# Patient Record
Sex: FEMALE | Race: WHITE | Employment: FULL TIME | URBAN - METROPOLITAN AREA
[De-identification: names, ages, dates, MRNs, and addresses within clinical notes are randomized per-mention and may not be internally consistent; named-entity substitution may affect disease eponyms.]

---

## 2018-07-30 ENCOUNTER — TELEPHONE (OUTPATIENT)
Dept: FAMILY MEDICINE CLINIC | Age: 23
End: 2018-07-30

## 2018-07-30 ENCOUNTER — OFFICE VISIT (OUTPATIENT)
Dept: FAMILY MEDICINE CLINIC | Age: 23
End: 2018-07-30

## 2018-07-30 VITALS
DIASTOLIC BLOOD PRESSURE: 72 MMHG | RESPIRATION RATE: 16 BRPM | WEIGHT: 104 LBS | HEIGHT: 60 IN | SYSTOLIC BLOOD PRESSURE: 109 MMHG | HEART RATE: 88 BPM | TEMPERATURE: 98.8 F | BODY MASS INDEX: 20.42 KG/M2

## 2018-07-30 DIAGNOSIS — N34.2 INFECTIVE URETHRITIS: ICD-10-CM

## 2018-07-30 DIAGNOSIS — F43.10 PTSD (POST-TRAUMATIC STRESS DISORDER): ICD-10-CM

## 2018-07-30 DIAGNOSIS — Z00.00 PHYSICAL EXAM: ICD-10-CM

## 2018-07-30 DIAGNOSIS — F12.10 MARIJUANA ABUSE: ICD-10-CM

## 2018-07-30 DIAGNOSIS — Z76.89 ENCOUNTER TO ESTABLISH CARE: ICD-10-CM

## 2018-07-30 DIAGNOSIS — F32.0 MILD SINGLE CURRENT EPISODE OF MAJOR DEPRESSIVE DISORDER (HCC): ICD-10-CM

## 2018-07-30 DIAGNOSIS — Z76.89 ENCOUNTER TO ESTABLISH CARE: Primary | ICD-10-CM

## 2018-07-30 DIAGNOSIS — F31.9 BIPOLAR 1 DISORDER (HCC): ICD-10-CM

## 2018-07-30 DIAGNOSIS — N92.6 MENSTRUAL CHANGES: ICD-10-CM

## 2018-07-30 LAB
BASOPHILS ABSOLUTE: 0.1 K/UL (ref 0–0.2)
BASOPHILS RELATIVE PERCENT: 0.5 %
CONTROL: NORMAL
EOSINOPHILS ABSOLUTE: 0 K/UL (ref 0–0.6)
EOSINOPHILS RELATIVE PERCENT: 0.2 %
HCT VFR BLD CALC: 35.8 % (ref 36–48)
HEMOGLOBIN: 12 G/DL (ref 12–16)
LYMPHOCYTES ABSOLUTE: 1.5 K/UL (ref 1–5.1)
LYMPHOCYTES RELATIVE PERCENT: 14.2 %
MCH RBC QN AUTO: 30.8 PG (ref 26–34)
MCHC RBC AUTO-ENTMCNC: 33.6 G/DL (ref 31–36)
MCV RBC AUTO: 91.9 FL (ref 80–100)
MONOCYTES ABSOLUTE: 0.4 K/UL (ref 0–1.3)
MONOCYTES RELATIVE PERCENT: 3.7 %
NEUTROPHILS ABSOLUTE: 8.7 K/UL (ref 1.7–7.7)
NEUTROPHILS RELATIVE PERCENT: 81.4 %
PDW BLD-RTO: 15.3 % (ref 12.4–15.4)
PLATELET # BLD: 277 K/UL (ref 135–450)
PMV BLD AUTO: 7.9 FL (ref 5–10.5)
PREGNANCY TEST URINE, POC: POSITIVE
RBC # BLD: 3.89 M/UL (ref 4–5.2)
WBC # BLD: 10.7 K/UL (ref 4–11)

## 2018-07-30 PROCEDURE — 81025 URINE PREGNANCY TEST: CPT | Performed by: NURSE PRACTITIONER

## 2018-07-30 PROCEDURE — 99385 PREV VISIT NEW AGE 18-39: CPT | Performed by: NURSE PRACTITIONER

## 2018-07-30 ASSESSMENT — ENCOUNTER SYMPTOMS
WHEEZING: 0
BLOOD IN STOOL: 0
SPUTUM PRODUCTION: 0
RESPIRATORY NEGATIVE: 1
EYE REDNESS: 0
SHORTNESS OF BREATH: 0
EYES NEGATIVE: 1
SINUS PAIN: 0
NAUSEA: 1
STRIDOR: 0
VOMITING: 0
SORE THROAT: 0
DOUBLE VISION: 0
ABDOMINAL PAIN: 0
EYE DISCHARGE: 0
CONSTIPATION: 0
ORTHOPNEA: 0
PHOTOPHOBIA: 0
BLURRED VISION: 0
BACK PAIN: 0
DIARRHEA: 1
EYE PAIN: 0
HEMOPTYSIS: 0
COUGH: 0
HEARTBURN: 0

## 2018-07-30 ASSESSMENT — PATIENT HEALTH QUESTIONNAIRE - PHQ9
SUM OF ALL RESPONSES TO PHQ9 QUESTIONS 1 & 2: 2
SUM OF ALL RESPONSES TO PHQ QUESTIONS 1-9: 2
1. LITTLE INTEREST OR PLEASURE IN DOING THINGS: 0
2. FEELING DOWN, DEPRESSED OR HOPELESS: 2

## 2018-07-30 NOTE — PATIENT INSTRUCTIONS
Thank you for enrolling in [address] Please follow the instructions below to securely access your online medical record. [employer] allows you to send messages to your doctor, view your test results, renew your prescriptions, schedule appointments, and more. How Do I Sign Up? 1. In your Internet browser, go to [email or web address]  2. Click on the Sign Up Now link in the Sign In box. You will see the New Member Sign Up page. 3. Enter your [employer] Access Code exactly as it appears below. You will not need to use this code after youve completed the sign-up process. If you do not sign up before the expiration date, you must request a new code. [employer] Access Code: Activation code not generated  Current [employer] Status: Active    4. Enter your Social Security Number ([id number]) and Date of Birth (mm/dd/yyyy) as indicated and click Submit. You will be taken to the next sign-up page. 5. Create a [employer] ID. This will be your [employer] login ID and cannot be changed, so think of one that is secure and easy to remember. 6. Create a [employer] password. You can change your password at any time. 7. Enter your Password Reset Question and Answer. This can be used at a later time if you forget your password. 8. Enter your e-mail address. You will receive e-mail notification when new information is available in [address] 9. Click Sign Up. You can now view your medical record. Additional Information  If you have questions, please contact your physician practice where you receive care. Remember, [employer] is NOT to be used for urgent needs. For medical emergencies, dial 911.

## 2018-07-30 NOTE — PROGRESS NOTES
Subjective:      Chief Complaint   Patient presents with    Established New Doctor    GI Problem     poss irritable bowel - few years       Patient ID: Pina Perry is a 25 y.o. female who presents to be established as a new pt. She has not had an PCP only her psychologist. Pt states she has chronic depression, bipolar and PTSD She has hx of \"cutting\". Feels her health has decreased due to her depression. She went to Urgent care and dx w chlamydia and took the medication prescribed. Frequent urination. IBS diagnosed for the past 4 years  There may be a chance she is pregnant. Breasts tender and have increased in size. Menses only two days long. She and her fiance have been trying for the past 4 months and is taking her prenatal vitamins. 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 Mental Health Problem   The primary symptoms include dysphoric mood, negative symptoms and somatic symptoms. The primary symptoms do not include hallucinations. The current episode started more than 1 month ago. This is a chronic problem. The somatic symptoms began more than 1 month ago. The somatic symptoms have been unchanged since their onset. The symptoms are moderate. Somatic symptoms include fatigue. Somatic symptoms do not include headaches, abdominal pain, heartburn, constipation, back pain or myalgias. The onset of the illness is precipitated by emotional stress. The degree of incapacity that she is experiencing as a consequence of her illness is moderate. Additional symptoms of the illness include insomnia, fatigue, attention impairment and poor judgment. Additional symptoms of the illness do not include headaches, abdominal pain or seizures. She does not admit to suicidal ideas. She does not have a plan to commit suicide. She does not contemplate harming herself. She has not already injured self. She does not contemplate injuring another person. She has not already  injured another person.        Family History   Problem Relation Age of Onset    High Blood Pressure Mother     High Cholesterol Mother     Mental Illness Mother     High Blood Pressure Father     High Cholesterol Father     Mental Illness Father     OCD Sister     High Blood Pressure Maternal Grandmother     Scoliosis Maternal Grandmother     Heart Disease Maternal Grandfather     Clotting Disorder Maternal Grandfather     Mental Illness Maternal Grandfather     Cancer Paternal Grandfather        Social History     Social History    Marital status: Single     Spouse name: N/A    Number of children: N/A    Years of education: N/A     Occupational History    Not on file. Social History Main Topics    Smoking status: Former Smoker     Packs/day: 0.25     Years: 10.00     Quit date: 6/30/2018    Smokeless tobacco: Never Used    Alcohol use No      Comment: holidays    Drug use: Yes     Types: Marijuana    Sexual activity: Yes     Partners: Male     Other Topics Concern    Not on file     Social History Narrative    No narrative on file       No current outpatient prescriptions on file prior to visit. No current facility-administered medications on file prior to visit. Review of Systems   Constitutional: Positive for fatigue and malaise/fatigue. Negative for chills, diaphoresis, fever and weight loss. HENT: Negative. Negative for congestion, ear discharge, ear pain, hearing loss, nosebleeds, sinus pain, sore throat and tinnitus. Eyes: Negative. Negative for blurred vision, double vision, photophobia, pain, discharge and redness. Respiratory: Negative. Negative for cough, hemoptysis, sputum production, shortness of breath, wheezing and stridor. Cardiovascular: Positive for chest pain and palpitations. Negative for orthopnea, claudication, leg swelling and PND. Severe anxiety attacks w chest pressure   Gastrointestinal: Positive for diarrhea and nausea.  Negative for abdominal pain, blood in stool, constipation, heartburn, melena and vomiting. Genitourinary: Positive for frequency and urgency. Negative for dysuria, flank pain and hematuria. Musculoskeletal: Negative. Negative for back pain, falls, joint pain, myalgias and neck pain. Skin: Negative. Negative for itching and rash. Neurological: Negative. Negative for dizziness, tingling, tremors, sensory change, speech change, focal weakness, seizures, loss of consciousness, weakness and headaches. Endo/Heme/Allergies: Negative. Negative for environmental allergies and polydipsia. Does not bruise/bleed easily. Psychiatric/Behavioral: Positive for depression, dysphoric mood and substance abuse (marijuana). Negative for hallucinations, memory loss and suicidal ideas. The patient is nervous/anxious and has insomnia. Patient Active Problem List    Diagnosis Date Noted   Sonal Pinon 2015     Priority: High    Encounter for supervision of other normal pregnancy 2015    UTI in pregnancy 2015    Adjustment disorder with mixed anxiety and depressed mood 02/15/2015    Drug abuse, marijuana 2015    Other specified  screening(V28.89) 2015    Smoker 2015    History of elective  2015       Objective:     Physical Exam   Constitutional: She is oriented to person, place, and time. She appears well-developed and well-nourished. No distress. HENT:   Head: Normocephalic and atraumatic. Right Ear: External ear normal.   Left Ear: External ear normal.   Nose: Nose normal.   Mouth/Throat: Oropharynx is clear and moist. No oropharyngeal exudate. Eyes: Conjunctivae and EOM are normal. Pupils are equal, round, and reactive to light. Right eye exhibits no discharge. Left eye exhibits no discharge. No scleral icterus. Neck: Normal range of motion. Neck supple. No JVD present. No tracheal deviation present. No thyromegaly present.    Cardiovascular: Normal rate, regular rhythm, normal heart sounds and 30 minutes on four or more days during the week. Counseled on skin safety, SPF 30 or higher prior to going outdoors and reapplication every two hours while outside.  Monitor moles for changes, report to provider if greater than 6 mm, color variations, asymmetry, redness, scales, and/or overlying skin changes  Counseled on safety, wear seatbelt, do not consume alcohol and drive or drive with anyone who has consumed alcohol  Counseled on safe sex practices, wear condoms, limit number of sexual partners  Counseled on importance of monthly self breast exams, perform on same time each month, monitor for new lumps/bumps/masses, tenderness, changes to size or contour, dimpling, nipple discharge, new nipple retraction, and overlying skin changes, report to provider  I will call patient with results of her lab work in the morning and discuss direction of care

## 2018-07-31 ENCOUNTER — TELEPHONE (OUTPATIENT)
Dept: FAMILY MEDICINE CLINIC | Age: 23
End: 2018-07-31

## 2018-07-31 DIAGNOSIS — Z3A.01 LESS THAN 8 WEEKS GESTATION OF PREGNANCY: Primary | ICD-10-CM

## 2018-07-31 DIAGNOSIS — F32.0 MILD SINGLE CURRENT EPISODE OF MAJOR DEPRESSIVE DISORDER (HCC): Primary | ICD-10-CM

## 2018-07-31 LAB
A/G RATIO: 1.8 (ref 1.1–2.2)
ALBUMIN SERPL-MCNC: 4.9 G/DL (ref 3.4–5)
ALP BLD-CCNC: 55 U/L (ref 40–129)
ALT SERPL-CCNC: 9 U/L (ref 10–40)
ANION GAP SERPL CALCULATED.3IONS-SCNC: 15 MMOL/L (ref 3–16)
AST SERPL-CCNC: 15 U/L (ref 15–37)
BILIRUB SERPL-MCNC: 0.5 MG/DL (ref 0–1)
BUN BLDV-MCNC: 10 MG/DL (ref 7–20)
CALCIUM SERPL-MCNC: 9.9 MG/DL (ref 8.3–10.6)
CHLORIDE BLD-SCNC: 102 MMOL/L (ref 99–110)
CO2: 24 MMOL/L (ref 21–32)
CREAT SERPL-MCNC: 0.6 MG/DL (ref 0.6–1.1)
GFR AFRICAN AMERICAN: >60
GFR NON-AFRICAN AMERICAN: >60
GLOBULIN: 2.7 G/DL
GLUCOSE BLD-MCNC: 87 MG/DL (ref 70–99)
GONADOTROPIN, CHORIONIC (HCG) QUANT: 97.8 MIU/ML
POTASSIUM SERPL-SCNC: 4.1 MMOL/L (ref 3.5–5.1)
SODIUM BLD-SCNC: 141 MMOL/L (ref 136–145)
TOTAL PROTEIN: 7.6 G/DL (ref 6.4–8.2)
TSH SERPL DL<=0.05 MIU/L-ACNC: 1.96 UIU/ML (ref 0.27–4.2)
VITAMIN D 25-HYDROXY: 35 NG/ML

## 2018-07-31 NOTE — TELEPHONE ENCOUNTER
Spoke to patient gave her the results of her lab work yesterday including a pregnancy test which was positive for both blood and urine. Patient was okay with this news. Does not have a gynecologist so referral for Dr. Jeanne Ding was placed. Phone number given to patient to call and make her own appointment. Initially gave her an appointment for CBT with Mekhi Blandon in [place] however patient's insurance does not cover her so an order was placed for Dr. Caitlin Gibson in [facility] who will be able to treat her based on her insurance. Mother stated that patient had a really bad panic attack last night, however I am not able to prescribe anything for her.  It is important that she sees the gynecologist as soon as possible

## 2018-08-02 ENCOUNTER — TELEPHONE (OUTPATIENT)
Dept: FAMILY MEDICINE CLINIC | Age: 23
End: 2018-08-02

## 2018-08-06 ENCOUNTER — TELEPHONE (OUTPATIENT)
Dept: FAMILY MEDICINE CLINIC | Age: 23
End: 2018-08-06

## 2018-08-07 ENCOUNTER — TELEPHONE (OUTPATIENT)
Dept: FAMILY MEDICINE CLINIC | Age: 23
End: 2018-08-07

## 2018-10-20 ENCOUNTER — HOSPITAL ENCOUNTER (EMERGENCY)
Age: 23
Discharge: HOME | End: 2018-10-20
Attending: EMERGENCY MEDICINE
Payer: COMMERCIAL

## 2018-10-20 VITALS
SYSTOLIC BLOOD PRESSURE: 110 MMHG | HEART RATE: 92 BPM | HEIGHT: 62 IN | RESPIRATION RATE: 18 BRPM | DIASTOLIC BLOOD PRESSURE: 68 MMHG | BODY MASS INDEX: 18.4 KG/M2 | WEIGHT: 100 LBS | OXYGEN SATURATION: 100 % | TEMPERATURE: 97.1 F

## 2018-10-20 DIAGNOSIS — Z34.90 PREGNANCY, UNSPECIFIED GESTATIONAL AGE: ICD-10-CM
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
## 2018-10-20 DIAGNOSIS — S39.012A STRAIN OF LUMBAR REGION, INITIAL ENCOUNTER: Primary | ICD-10-CM

## 2018-10-20 LAB
ABO/RH: NORMAL
BACTERIA: ABNORMAL /HPF
BILIRUBIN URINE: NEGATIVE
BLOOD, URINE: ABNORMAL
CLARITY: CLEAR
COLOR: YELLOW
CRYSTALS, UA: ABNORMAL /HPF
EPITHELIAL CELLS, UA: ABNORMAL /HPF
GLUCOSE URINE: NEGATIVE MG/DL
GONADOTROPIN, CHORIONIC (HCG) QUANT: NORMAL MIU/ML
KETONES, URINE: NEGATIVE MG/DL
LEUKOCYTE ESTERASE, URINE: ABNORMAL
MICROSCOPIC EXAMINATION: YES
MUCUS: ABNORMAL /LPF
NITRITE, URINE: NEGATIVE
PH UA: 6
PROTEIN UA: NEGATIVE MG/DL
RBC UA: ABNORMAL /HPF (ref 0–2)
SPECIFIC GRAVITY UA: 1.02
URINE TYPE: ABNORMAL
UROBILINOGEN, URINE: 0.2 E.U./DL
WBC UA: ABNORMAL /HPF (ref 0–5)

## 2018-10-20 PROCEDURE — 81001 URINALYSIS AUTO W/SCOPE: CPT

## 2018-10-20 PROCEDURE — 99284 EMERGENCY DEPT VISIT MOD MDM: CPT

## 2018-10-20 PROCEDURE — 86900 BLOOD TYPING SEROLOGIC ABO: CPT

## 2018-10-20 PROCEDURE — 84702 CHORIONIC GONADOTROPIN TEST: CPT

## 2018-10-20 PROCEDURE — 86901 BLOOD TYPING SEROLOGIC RH(D): CPT

## 2018-10-20 RX ORDER — IBUPROFEN 200 MG
200 TABLET ORAL EVERY 6 HOURS PRN
COMMUNITY
End: 2018-10-21

## 2018-10-20 ASSESSMENT — ENCOUNTER SYMPTOMS
SHORTNESS OF BREATH: 0
BACK PAIN: 1
ABDOMINAL PAIN: 1

## 2018-10-20 ASSESSMENT — PAIN SCALES - GENERAL: PAINLEVEL_OUTOF10: 3

## 2018-10-20 ASSESSMENT — PAIN DESCRIPTION - LOCATION: LOCATION: BACK

## 2018-10-20 ASSESSMENT — PAIN DESCRIPTION - FREQUENCY: FREQUENCY: CONTINUOUS

## 2018-10-20 ASSESSMENT — PAIN DESCRIPTION - DESCRIPTORS: DESCRIPTORS: ACHING

## 2018-10-20 ASSESSMENT — PAIN DESCRIPTION - PAIN TYPE: TYPE: ACUTE PAIN

## 2018-10-20 ASSESSMENT — PAIN DESCRIPTION - ORIENTATION: ORIENTATION: LOWER
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
## 2018-10-21 ENCOUNTER — HOSPITAL ENCOUNTER (EMERGENCY)
Age: 23
Discharge: HOME | End: 2018-10-21
Attending: EMERGENCY MEDICINE
Payer: COMMERCIAL

## 2018-10-21 VITALS
BODY MASS INDEX: 19.63 KG/M2 | SYSTOLIC BLOOD PRESSURE: 96 MMHG | RESPIRATION RATE: 14 BRPM | HEIGHT: 60 IN | HEART RATE: 72 BPM | WEIGHT: 100 LBS | TEMPERATURE: 98.2 F | OXYGEN SATURATION: 100 % | DIASTOLIC BLOOD PRESSURE: 59 MMHG

## 2018-10-21 DIAGNOSIS — R52 BODY ACHES: ICD-10-CM

## 2018-10-21 DIAGNOSIS — R11.2 NAUSEA VOMITING AND DIARRHEA: Primary | ICD-10-CM

## 2018-10-21 DIAGNOSIS — R19.7 NAUSEA VOMITING AND DIARRHEA: Primary | ICD-10-CM

## 2018-10-21 LAB
A/G RATIO: 1.2 (ref 1.1–2.2)
ALBUMIN SERPL-MCNC: 3.7 G/DL (ref 3.4–5)
ALP BLD-CCNC: 43 U/L (ref 40–129)
ALT SERPL-CCNC: 6 U/L (ref 10–40)
ANION GAP SERPL CALCULATED.3IONS-SCNC: 12 MMOL/L (ref 3–16)
AST SERPL-CCNC: 13 U/L (ref 15–37)
BASOPHILS ABSOLUTE: 0 K/UL (ref 0–0.2)
BASOPHILS RELATIVE PERCENT: 0.4 %
BILIRUB SERPL-MCNC: 0.3 MG/DL (ref 0–1)
BILIRUBIN URINE: NEGATIVE
BLOOD, URINE: NEGATIVE
BUN BLDV-MCNC: 5 MG/DL (ref 7–20)
CALCIUM SERPL-MCNC: 8.9 MG/DL (ref 8.3–10.6)
CHLORIDE BLD-SCNC: 102 MMOL/L (ref 99–110)
CLARITY: CLEAR
CO2: 23 MMOL/L (ref 21–32)
COLOR: YELLOW
CREAT SERPL-MCNC: <0.5 MG/DL (ref 0.6–1.1)
EOSINOPHILS ABSOLUTE: 0 K/UL (ref 0–0.6)
EOSINOPHILS RELATIVE PERCENT: 0.6 %
GFR AFRICAN AMERICAN: >60
GFR NON-AFRICAN AMERICAN: >60
GLOBULIN: 3.2 G/DL
GLUCOSE BLD-MCNC: 86 MG/DL (ref 70–99)
GLUCOSE URINE: NEGATIVE MG/DL
GONADOTROPIN, CHORIONIC (HCG) QUANT: NORMAL MIU/ML
HCT VFR BLD CALC: 32.9 % (ref 36–48)
HEMOGLOBIN: 11.3 G/DL (ref 12–16)
KETONES, URINE: NEGATIVE MG/DL
LEUKOCYTE ESTERASE, URINE: NEGATIVE
LYMPHOCYTES ABSOLUTE: 1.4 K/UL (ref 1–5.1)
LYMPHOCYTES RELATIVE PERCENT: 17.9 %
MCH RBC QN AUTO: 31.4 PG (ref 26–34)
MCHC RBC AUTO-ENTMCNC: 34.3 G/DL (ref 31–36)
MCV RBC AUTO: 91.3 FL (ref 80–100)
MICROSCOPIC EXAMINATION: NORMAL
MONOCYTES ABSOLUTE: 0.3 K/UL (ref 0–1.3)
MONOCYTES RELATIVE PERCENT: 4.4 %
NEUTROPHILS ABSOLUTE: 6 K/UL (ref 1.7–7.7)
NEUTROPHILS RELATIVE PERCENT: 76.7 %
NITRITE, URINE: NEGATIVE
PDW BLD-RTO: 14 % (ref 12.4–15.4)
PH UA: 7.5
PLATELET # BLD: 233 K/UL (ref 135–450)
PMV BLD AUTO: 8.2 FL (ref 5–10.5)
POTASSIUM SERPL-SCNC: 3.6 MMOL/L (ref 3.5–5.1)
PROTEIN UA: NEGATIVE MG/DL
RAPID INFLUENZA  B AGN: NEGATIVE
RAPID INFLUENZA A AGN: NEGATIVE
RBC # BLD: 3.6 M/UL (ref 4–5.2)
SODIUM BLD-SCNC: 137 MMOL/L (ref 136–145)
SPECIFIC GRAVITY UA: 1.01
TOTAL PROTEIN: 6.9 G/DL (ref 6.4–8.2)
URINE TYPE: NORMAL
UROBILINOGEN, URINE: 0.2 E.U./DL
WBC # BLD: 7.9 K/UL (ref 4–11)

## 2018-10-21 PROCEDURE — 81003 URINALYSIS AUTO W/O SCOPE: CPT

## 2018-10-21 PROCEDURE — 84702 CHORIONIC GONADOTROPIN TEST: CPT

## 2018-10-21 PROCEDURE — 87804 INFLUENZA ASSAY W/OPTIC: CPT

## 2018-10-21 PROCEDURE — 96374 THER/PROPH/DIAG INJ IV PUSH: CPT

## 2018-10-21 PROCEDURE — 99284 EMERGENCY DEPT VISIT MOD MDM: CPT

## 2018-10-21 PROCEDURE — 96361 HYDRATE IV INFUSION ADD-ON: CPT

## 2018-10-21 PROCEDURE — 80053 COMPREHEN METABOLIC PANEL: CPT

## 2018-10-21 PROCEDURE — 85025 COMPLETE CBC W/AUTO DIFF WBC: CPT

## 2018-10-21 PROCEDURE — 6360000002 HC RX W HCPCS: Performed by: EMERGENCY MEDICINE

## 2018-10-21 PROCEDURE — 96375 TX/PRO/DX INJ NEW DRUG ADDON: CPT

## 2018-10-21 PROCEDURE — 2580000003 HC RX 258: Performed by: EMERGENCY MEDICINE

## 2018-10-21 PROCEDURE — 6370000000 HC RX 637 (ALT 250 FOR IP): Performed by: EMERGENCY MEDICINE

## 2018-10-21 RX ORDER — METOCLOPRAMIDE HYDROCHLORIDE 5 MG/ML
10 INJECTION INTRAMUSCULAR; INTRAVENOUS ONCE
Status: COMPLETED | OUTPATIENT
Start: 2018-10-21 | End: 2018-10-21

## 2018-10-21 RX ORDER — 0.9 % SODIUM CHLORIDE 0.9 %
1000 INTRAVENOUS SOLUTION INTRAVENOUS ONCE
Status: COMPLETED | OUTPATIENT
Start: 2018-10-21 | End: 2018-10-21

## 2018-10-21 RX ORDER — ACETAMINOPHEN 500 MG
1000 TABLET ORAL ONCE
Status: COMPLETED | OUTPATIENT
Start: 2018-10-21 | End: 2018-10-21

## 2018-10-21 RX ORDER — METOCLOPRAMIDE 10 MG/1
10 TABLET ORAL 3 TIMES DAILY PRN
Qty: 12 TABLET | Refills: 0 | Status: SHIPPED | OUTPATIENT
Start: 2018-10-21 | End: 2019-09-21

## 2018-10-21 RX ORDER — DIPHENHYDRAMINE HYDROCHLORIDE 50 MG/ML
12.5 INJECTION INTRAMUSCULAR; INTRAVENOUS ONCE
Status: COMPLETED | OUTPATIENT
Start: 2018-10-21 | End: 2018-10-21

## 2018-10-21 RX ADMIN — DIPHENHYDRAMINE HYDROCHLORIDE 12.5 MG: 50 INJECTION, SOLUTION INTRAMUSCULAR; INTRAVENOUS at 10:32

## 2018-10-21 RX ADMIN — SODIUM CHLORIDE 1000 ML: 9 INJECTION, SOLUTION INTRAVENOUS at 10:31

## 2018-10-21 RX ADMIN — METOCLOPRAMIDE 10 MG: 5 INJECTION, SOLUTION INTRAMUSCULAR; INTRAVENOUS at 10:31

## 2018-10-21 RX ADMIN — ACETAMINOPHEN 1000 MG: 500 TABLET ORAL at 10:32

## 2018-10-21 ASSESSMENT — PAIN SCALES - GENERAL: PAINLEVEL_OUTOF10: 0

## 2018-10-21 ASSESSMENT — PAIN DESCRIPTION - DESCRIPTORS: DESCRIPTORS: ACHING

## 2018-10-21 ASSESSMENT — PAIN DESCRIPTION - PAIN TYPE: TYPE: ACUTE PAIN

## 2018-10-21 NOTE — ED PROVIDER NOTES
5.0 - 8.0    Protein, UA Negative Negative mg/dL    Urobilinogen, Urine 0.2 <2.0 E.U./dL    Nitrite, Urine Negative Negative    Leukocyte Esterase, Urine Negative Negative    Microscopic Examination Not Indicated     Urine Type Not Specified        ED COURSE/MDM  Patient seen and evaluated. Blood work here is unremarkable as well as her urine. Better after medications, vital signs are stable without any fever. She does have follow-up with ObGyn this week. Discharge her home with [facility] and she can follow up as scheduled. Sera Cabrera CLINICAL IMPRESSION  1. Nausea vomiting and diarrhea    2. Body aches        Blood pressure (!) 96/59, pulse 72, temperature 98.2 °F (36.8 °C), temperature source Oral, resp. rate 14, height 5' (1.524 m), weight 100 lb (45.4 kg), last menstrual period 07/04/2018, SpO2 100 %, not currently breastfeeding. DISPOSITION  Aundrea Shultz was admitted in stable condition. This chart was generated in part by using Dragon Dictation system and may contain errors related to that system including errors in grammar, punctuation, and spelling, as well as words and phrases that may be inappropriate. When dictating, effort is made to correct spelling/grammar errors. If there are any questions or concerns please feel free to contact the dictating provider for clarification.      Xiang Orr DO  ATTENDING EMERGENCY MEDICINE        Cathy Guerrero DO  10/21/18 3664

## 2019-09-15 ENCOUNTER — HOSPITAL ENCOUNTER (EMERGENCY)
Age: 24
Discharge: HOME | End: 2019-09-15
Attending: EMERGENCY MEDICINE
Payer: COMMERCIAL

## 2019-09-15 VITALS
OXYGEN SATURATION: 98 % | HEART RATE: 87 BPM | SYSTOLIC BLOOD PRESSURE: 127 MMHG | DIASTOLIC BLOOD PRESSURE: 67 MMHG | WEIGHT: 100 LBS | BODY MASS INDEX: 19.63 KG/M2 | HEIGHT: 60 IN | TEMPERATURE: 97.8 F | RESPIRATION RATE: 18 BRPM

## 2019-09-15 DIAGNOSIS — F41.1 ANXIETY STATE: Primary | ICD-10-CM

## 2019-09-15 PROCEDURE — 99284 EMERGENCY DEPT VISIT MOD MDM: CPT

## 2019-09-15 RX ORDER — HYDROXYZINE PAMOATE 25 MG/1
50 CAPSULE ORAL 3 TIMES DAILY PRN
Qty: 30 CAPSULE | Refills: 0 | Status: SHIPPED | OUTPATIENT
Start: 2019-09-15 | End: 2019-09-29

## 2019-09-15 RX ORDER — SERTRALINE HYDROCHLORIDE 100 MG/1
100 TABLET, FILM COATED ORAL DAILY
Qty: 30 TABLET | Refills: 0 | Status: SHIPPED | OUTPATIENT
Start: 2019-09-15 | End: 2020-06-19 | Stop reason: ALTCHOICE

## 2019-09-16 NOTE — ED NOTES
Level of Care Disposition: DISCHARGE FROM [facility] SERVICES      Patient was seen by ED provider and [facility] AN AFFILIATE OF [facility] staff. The case was presented to psychiatric provider on-call KAITY Logan. Based on the ED evaluation and information presented to the provider by Brandon Sin RN the decision was made to discharge patient with the following referrals: IOP appointment      RATIONALE FOR NON-ADMISSION:  The patient does not meet criteria for an involuntary psychiatric admission because patient was never suicidal and was only seeking outpatient resources.       Insurance Pre certification Authorization: N/A         Magaly Luke RN  09/15/19 2021

## 2019-09-16 NOTE — ED PROVIDER NOTES
edema.  GASTROINTESTINAL: Abdomen is soft and nontender throughout with active bowel sounds. MUSCULOSKELETAL:  Extremities are nontender with full range of motion. Neurovascularly intact. DERMATOLOGIC: Skin is warm pink and dry. No rash or lesions appreciated. NEUROLOGIC: Awake and alert, oriented ×4 and appears to be at baseline status. Moves all extremities normally. Normal sensory and motor function  PSYCHIATRIC: Awake and alert and cooperative. RADIOLOGY  X-RAYS:  I have reviewed radiologic plain film image(s). ALL OTHER NON-PLAIN FILM IMAGES SUCH AS CT, ULTRASOUND AND MRI HAVE BEEN READ BY THE RADIOLOGIST. No orders to display              PROCEDURES    ED COURSE/MDM  Patient seen and evaluated. Behavioral health staff saw her and talked with her. I do not see need for laboratory studies. We will restart her on her Zoloft and Vistaril. Given resources for outpatient follow-up. Threatened to return if she feels like things are decompensating or having any suicidal homicidal thoughts. I do feel patient can be safely discharged to home. Recommend follow up with PCP in 2-3 days for re-evaluation. Reasons to RT ED discussed. Patient expresses understanding and is in agreement with plan. Patient was given scripts for the following medications. I counseled patient how to take these medications. New Prescriptions    HYDROXYZINE (VISTARIL) 25 MG CAPSULE    Take 2 capsules by mouth 3 times daily as needed for Anxiety    SERTRALINE (ZOLOFT) 100 MG TABLET    Take 1 tablet by mouth daily           CLINICAL IMPRESSION  1. Anxiety state        Blood pressure 127/67, pulse 87, temperature 97.8 °F (36.6 °C), resp. rate 18, height 5' (1.524 m), weight 100 lb (45.4 kg), SpO2 98 %, not currently breastfeeding. DISPOSITION  Patient was discharged to home in good condition.               Gabriella Perez MD  09/15/19 8239

## 2019-09-17 ENCOUNTER — HOSPITAL ENCOUNTER (OUTPATIENT)
Age: 24
Discharge: HOME | End: 2019-09-17
Payer: COMMERCIAL

## 2019-09-17 ENCOUNTER — HOSPITAL ENCOUNTER (OUTPATIENT)
Dept: PSYCHIATRY | Age: 24
Setting detail: THERAPIES SERIES
Discharge: HOME | End: 2019-09-17

## 2019-09-17 VITALS — SYSTOLIC BLOOD PRESSURE: 104 MMHG | DIASTOLIC BLOOD PRESSURE: 62 MMHG | HEART RATE: 60 BPM

## 2019-09-17 DIAGNOSIS — F43.23 ADJUSTMENT DISORDER WITH MIXED ANXIETY AND DEPRESSED MOOD: ICD-10-CM

## 2019-09-17 PROCEDURE — G0410 GRP PSYCH PARTIAL HOSP 45-50: HCPCS | Performed by: COUNSELOR

## 2019-09-17 PROCEDURE — G0410 GRP PSYCH PARTIAL HOSP 45-50: HCPCS
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
## 2019-09-17 NOTE — BH NOTE
didn't get an . [facility] states that she wants to get on her feet before telling her family that she Is pregnant. She states \" I really, just want to break the cycle\". She states that she has struggled with anxiety most of her life , but that it has increased since her recent relationship. She states \" it is like my mind shuts down and its like I cant think and I hyperventilate. \" she states that this occurs 5 times a day. She states that she has a history of cutting and that \" I hold my breathe, not to the point of passing out, but to where people were concerned\". She state that she wants to learn how to deal with her anxiety. She states that she has difficulty with relationships and that she doesn't trust anybody. She also feels that people are out to get her. She states that she has a history of being hospitalized here at [facility] in 1 and in [address] [place] [address] in 2016. She states that the admission here was due to suicidal thoughts and abel was due to postpartum depression. She states that she has a daughter that lives in [address] [place] [address]. She states that she has been off of her medications, but that her zoloft and vistaril were restarted in the ED. This writer did confirm this through ED note and referral to our program.   She denies current SI/HI,AVH. She will begin PHP today.

## 2019-09-18 ENCOUNTER — HOSPITAL ENCOUNTER (OUTPATIENT)
Dept: PSYCHIATRY | Age: 24
Setting detail: THERAPIES SERIES
Discharge: HOME | End: 2019-09-18

## 2019-09-18 ENCOUNTER — HOSPITAL ENCOUNTER (OUTPATIENT)
Dept: PSYCHIATRY | Age: 24
Setting detail: THERAPIES SERIES
Discharge: HOME | End: 2019-09-18
Payer: COMMERCIAL

## 2019-09-18 DIAGNOSIS — F33.1 MODERATE EPISODE OF RECURRENT MAJOR DEPRESSIVE DISORDER (HCC): ICD-10-CM

## 2019-09-18 DIAGNOSIS — F43.10 PTSD (POST-TRAUMATIC STRESS DISORDER): Chronic | ICD-10-CM

## 2019-09-18 DIAGNOSIS — F43.23 ADJUSTMENT DISORDER WITH MIXED ANXIETY AND DEPRESSED MOOD: ICD-10-CM

## 2019-09-18 DIAGNOSIS — Z3A.01 LESS THAN 8 WEEKS GESTATION OF PREGNANCY: ICD-10-CM

## 2019-09-18 PROBLEM — F32.A DEPRESSIVE DISORDER: Status: ACTIVE | Noted: 2019-09-18

## 2019-09-18 PROCEDURE — G0410 GRP PSYCH PARTIAL HOSP 45-50: HCPCS

## 2019-09-18 PROCEDURE — 99223 1ST HOSP IP/OBS HIGH 75: CPT | Performed by: PSYCHIATRY & NEUROLOGY

## 2019-09-18 PROCEDURE — G0410 GRP PSYCH PARTIAL HOSP 45-50: HCPCS | Performed by: COUNSELOR

## 2019-09-18 RX ORDER — HYDROXYZINE PAMOATE 25 MG/1
25 CAPSULE ORAL 2 TIMES DAILY PRN
Qty: 60 CAPSULE | Refills: 0 | Status: SHIPPED | OUTPATIENT
Start: 2019-09-18 | End: 2019-09-21

## 2019-09-18 NOTE — GROUP NOTE
Group Therapy Note    Date: September 18    Group Start Time: 11:15 AM  Group End Time: 12:15 PM  Group Topic: Cognitive Skills    [facility]    BALTAZAR Marie        Group Therapy Note    Attendees: 10    Group goal: to learn and discuss that anger is a normal human emotion and discussed ways to manage anger in a healthy way. [place] what it means to \"fight fair\" and the rules of \"fighting fair. \"            Notes:  Pt actively participated in the group discussion on anger and was able to apply to her life. Status After Intervention:  Improved    Participation Level:  Active Listener and Interactive    Participation Quality: Appropriate, Attentive, Sharing and Supportive      Speech:  normal      Thought Process/Content: Logical      Affective Functioning: Congruent      Mood: anxious and depressed      Level of consciousness:  Alert, Oriented x4 and Attentive      Response to Learning: Progressing to goal      Endings: None Reported    Modes of Intervention: Education, Support, Socialization, Exploration, Clarifying and Problem-solving      Discipline Responsible: /Counselor      Signature:  BALTAZAR Phelps

## 2019-09-18 NOTE — GROUP NOTE
Group Therapy Note    Date: September 18    Group Start Time: 10:00 AM  Group End Time: 11:00 AM  Group Topic: Relapse Prevention    [facility]    Sana Donaldson, [facility]        Group Therapy Note    Attendees: 8         Patient's Goal:  Patient will complete worksheet on People Pleasing. Will discuss how people pleasing behavior negatively impacts mental health. Notes:  Patient engaged well in group. Completed the worksheet and discussed. She talked about her tendency to say yes when she really means no. Talked about how that makes her feel. Status After Intervention:  Improved    Participation Level:  Active Listener and Interactive    Participation Quality: Appropriate, Attentive, Sharing and Supportive    Speech:  normal    Thought Process/Content: Logical  Linear    Affective Functioning: Congruent    Mood: anxious and depressed    Level of consciousness:  Oriented x4    Response to Learning: Able to verbalize current knowledge/experience and Capable of insight    Endings: None Reported    Modes of Intervention: Education, Support, Socialization and Exploration    Discipline Responsible: /Counselor      Signature:  Sana Donaldson, [facility]

## 2019-09-19 NOTE — H&P
father exhibited signs of Munchausen's by proxy. FAMILY PSYCH HISTORY:  Mother had borderline personality with  depression, father had bipolar disorder, OSD and lives in [place]. MEDICAL HISTORY:  Significant for pregnancy of 7 weeks. SOCIAL HISTORY:  Lives with a friend. Mother lives in her own home in  the area. She has ongoing contact with her. The patient grew up in  [place]. CURRENT MEDICATIONS:  Vistaril 50 mg b.i.d. but feels drowsy, Zoloft 100  mg daily, multivitamin. ALLERGIES TO MEDS:  MONISTAT. REVIEW OF SYSTEMS:  Pertinent positives in the HPI. Otherwise,  negative. PHYSICAL EXAMINATION:  Per Real Garza MD, 09/16/2019:  VITAL SIGNS:  From 09/17/2019, pulse 60, blood pressure 104/62. On  09/15/2019, she was 100 pounds, BMI 19.6. LEGAL ISSUES:  None at this time. MENTAL STATUS:  The patient is a 73-year-old female who is very pleasant  and cooperative. She engaged easily. She made good eye contact. Speech is normal rate and tone. She said her mood was done, affect was  constricted. Thoughts were coherent and logical.  No auditory or visual  hallucinations. No current suicidal or homicidal ideation. Insight and  judgment impaired. Oriented to person, place and time. Fund of  knowledge and language was good. Attention and concentration was good. Able to recall 3 objects immediately. Her movements were normal.  No  abnormal gait. DIAGNOSES:  Axis I:  1. Major depressive episode, recurrent, nonpsychotic, moderate type. 2.  Marijuana abuse. 3.  PTSD, chronic, residual.  Axis II:  Deferred. Axis III:  Pregnancy. Axis IV:  Moderate. Axis V:  65. PLAN:  1. Continue with Zoloft 100 mg daily for depression. 2.  We will reduce Vistaril to 25 mg b.i.d. as needed for anxiety. 3. In PHP for 2 weeks, followed by a stepdown to IOP.   4.  Goal for discharging program will be to develop appropriate coping  strategies when feeling depressed and to manage her

## 2019-09-20 ENCOUNTER — HOSPITAL ENCOUNTER (OUTPATIENT)
Dept: PSYCHIATRY | Age: 24
Setting detail: THERAPIES SERIES
Discharge: HOME | End: 2019-09-20

## 2019-09-20 VITALS — DIASTOLIC BLOOD PRESSURE: 60 MMHG | HEART RATE: 64 BPM | SYSTOLIC BLOOD PRESSURE: 100 MMHG

## 2019-09-20 DIAGNOSIS — F43.23 ADJUSTMENT DISORDER WITH MIXED ANXIETY AND DEPRESSED MOOD: ICD-10-CM

## 2019-09-20 PROCEDURE — G0410 GRP PSYCH PARTIAL HOSP 45-50: HCPCS | Performed by: COUNSELOR

## 2019-09-20 PROCEDURE — G0410 GRP PSYCH PARTIAL HOSP 45-50: HCPCS

## 2019-09-21 ENCOUNTER — HOSPITAL ENCOUNTER (EMERGENCY)
Age: 24
Discharge: HOME | End: 2019-09-21
Attending: EMERGENCY MEDICINE
Payer: COMMERCIAL

## 2019-09-21 VITALS
RESPIRATION RATE: 15 BRPM | OXYGEN SATURATION: 100 % | SYSTOLIC BLOOD PRESSURE: 115 MMHG | HEART RATE: 83 BPM | BODY MASS INDEX: 19.63 KG/M2 | DIASTOLIC BLOOD PRESSURE: 73 MMHG | WEIGHT: 100 LBS | HEIGHT: 60 IN | TEMPERATURE: 98.1 F

## 2019-09-21 DIAGNOSIS — F12.10 MARIJUANA ABUSE: ICD-10-CM

## 2019-09-21 DIAGNOSIS — F39 MOOD DISORDER (HCC): Primary | ICD-10-CM

## 2019-09-21 DIAGNOSIS — Z3A.08 8 WEEKS GESTATION OF PREGNANCY: ICD-10-CM

## 2019-09-21 DIAGNOSIS — Z72.89 DELIBERATE SELF-CUTTING: ICD-10-CM

## 2019-09-21 LAB
A/G RATIO: 1.9 (ref 1.1–2.2)
ALBUMIN SERPL-MCNC: 5.2 G/DL (ref 3.4–5)
ALP BLD-CCNC: 45 U/L (ref 40–129)
ALT SERPL-CCNC: 7 U/L (ref 10–40)
AMPHETAMINE SCREEN, URINE: ABNORMAL
ANION GAP SERPL CALCULATED.3IONS-SCNC: 12 MMOL/L (ref 3–16)
AST SERPL-CCNC: 12 U/L (ref 15–37)
BARBITURATE SCREEN URINE: ABNORMAL
BASOPHILS ABSOLUTE: 0 K/UL (ref 0–0.2)
BASOPHILS RELATIVE PERCENT: 0.4 %
BENZODIAZEPINE SCREEN, URINE: ABNORMAL
BILIRUB SERPL-MCNC: 0.5 MG/DL (ref 0–1)
BILIRUBIN URINE: NEGATIVE
BLOOD, URINE: NEGATIVE
BUN BLDV-MCNC: 9 MG/DL (ref 7–20)
CALCIUM SERPL-MCNC: 9.7 MG/DL (ref 8.3–10.6)
CANNABINOID SCREEN URINE: POSITIVE
CHLORIDE BLD-SCNC: 102 MMOL/L (ref 99–110)
CLARITY: CLEAR
CO2: 23 MMOL/L (ref 21–32)
COCAINE METABOLITE SCREEN URINE: ABNORMAL
COLOR: YELLOW
CREAT SERPL-MCNC: <0.5 MG/DL (ref 0.6–1.1)
EOSINOPHILS ABSOLUTE: 0 K/UL (ref 0–0.6)
EOSINOPHILS RELATIVE PERCENT: 0.3 %
ETHANOL: NORMAL MG/DL (ref 0–0.08)
GFR AFRICAN AMERICAN: >60
GFR NON-AFRICAN AMERICAN: >60
GLOBULIN: 2.8 G/DL
GLUCOSE BLD-MCNC: 93 MG/DL (ref 70–99)
GLUCOSE URINE: NEGATIVE MG/DL
HCG(URINE) PREGNANCY TEST: POSITIVE
HCT VFR BLD CALC: 37.3 % (ref 36–48)
HEMOGLOBIN: 12.5 G/DL (ref 12–16)
KETONES, URINE: NEGATIVE MG/DL
LEUKOCYTE ESTERASE, URINE: NEGATIVE
LYMPHOCYTES ABSOLUTE: 1.5 K/UL (ref 1–5.1)
LYMPHOCYTES RELATIVE PERCENT: 19.8 %
Lab: ABNORMAL
MCH RBC QN AUTO: 30.6 PG (ref 26–34)
MCHC RBC AUTO-ENTMCNC: 33.6 G/DL (ref 31–36)
MCV RBC AUTO: 91 FL (ref 80–100)
METHADONE SCREEN, URINE: ABNORMAL
MICROSCOPIC EXAMINATION: NORMAL
MONOCYTES ABSOLUTE: 0.3 K/UL (ref 0–1.3)
MONOCYTES RELATIVE PERCENT: 4.2 %
NEUTROPHILS ABSOLUTE: 5.9 K/UL (ref 1.7–7.7)
NEUTROPHILS RELATIVE PERCENT: 75.3 %
NITRITE, URINE: NEGATIVE
OPIATE SCREEN URINE: ABNORMAL
OXYCODONE URINE: ABNORMAL
PDW BLD-RTO: 14.3 % (ref 12.4–15.4)
PH UA: 6.5
PH UA: 6.5 (ref 5–8)
PHENCYCLIDINE SCREEN URINE: ABNORMAL
PLATELET # BLD: 258 K/UL (ref 135–450)
PMV BLD AUTO: 8.1 FL (ref 5–10.5)
POTASSIUM REFLEX MAGNESIUM: 4.2 MMOL/L (ref 3.5–5.1)
PROPOXYPHENE SCREEN: ABNORMAL
PROTEIN UA: NEGATIVE MG/DL
RBC # BLD: 4.1 M/UL (ref 4–5.2)
SODIUM BLD-SCNC: 137 MMOL/L (ref 136–145)
SPECIFIC GRAVITY UA: 1.02 (ref 1–1.03)
TOTAL PROTEIN: 8 G/DL (ref 6.4–8.2)
URINE REFLEX TO CULTURE: NORMAL
URINE TYPE: NORMAL
UROBILINOGEN, URINE: 0.2 E.U./DL
WBC # BLD: 7.8 K/UL (ref 4–11)

## 2019-09-21 PROCEDURE — 80307 DRUG TEST PRSMV CHEM ANLYZR: CPT

## 2019-09-21 PROCEDURE — 81003 URINALYSIS AUTO W/O SCOPE: CPT

## 2019-09-21 PROCEDURE — 85025 COMPLETE CBC W/AUTO DIFF WBC: CPT

## 2019-09-21 PROCEDURE — 99285 EMERGENCY DEPT VISIT HI MDM: CPT

## 2019-09-21 PROCEDURE — G0480 DRUG TEST DEF 1-7 CLASSES: HCPCS

## 2019-09-21 PROCEDURE — 84703 CHORIONIC GONADOTROPIN ASSAY: CPT

## 2019-09-21 PROCEDURE — 80053 COMPREHEN METABOLIC PANEL: CPT

## 2019-09-21 RX ORDER — HYDROXYZINE PAMOATE 50 MG/1
50 CAPSULE ORAL 2 TIMES DAILY PRN
COMMUNITY
End: 2019-09-25

## 2019-09-21 NOTE — ED PROVIDER NOTES
looking forward to that very much. No other complaints, modifying factors or associated symptoms. I have reviewed the following from the nursing documentation.     Past Medical History:   Diagnosis Date    ADD (attention deficit disorder)     Anxiety     Bipolar affective ([address]     Depression     Drug abuse, marijuana 2015    Hormone disorder     PTSD (post-traumatic stress disorder)     Urinary tract infection     Yeast infection      Past Surgical History:   Procedure Laterality Date    ESOPHAGUS SURGERY  2016    INDUCED   2014    WISDOM TOOTH EXTRACTION  2018     Family History   Problem Relation Age of Onset    High Blood Pressure Mother     High Cholesterol Mother     Mental Illness Mother     High Blood Pressure Father     High Cholesterol Father     Mental Illness Father     OCD Sister     High Blood Pressure Maternal Grandmother     Scoliosis Maternal Grandmother     Heart Disease Maternal Grandfather     Clotting Disorder Maternal Grandfather     Mental Illness Maternal Grandfather     Cancer Paternal Grandfather      Social History     Socioeconomic History    Marital status: Single     Spouse name: Not on file    Number of children: Not on file    Years of education: Not on file    Highest education level: Not on file   Occupational History    Not on file   Social Needs    Financial resource strain: Not on file    Food insecurity:     Worry: Not on file     Inability: Not on file    Transportation needs:     Medical: Not on file     Non-medical: Not on file   Tobacco Use    Smoking status: Former Smoker     Packs/day: 0.25     Years: 10.00     Pack years: 2.50     Last attempt to quit: 2018     Years since quittin.2    Smokeless tobacco: Never Used   Substance and Sexual Activity    Alcohol use: No     Comment: holidays    Drug use: Yes     Types: Marijuana     Comment: states she last smoked 2 days ago    Sexual activity: Yes Partners: Male   Lifestyle    Physical activity:     Days per week: Not on file     Minutes per session: Not on file    Stress: Not on file   Relationships    Social connections:     Talks on phone: Not on file     Gets together: Not on file     Attends Nondenominational service: Not on file     Active member of club or organization: Not on file     Attends meetings of clubs or organizations: Not on file     Relationship status: Not on file    Intimate partner violence:     Fear of current or ex partner: Not on file     Emotionally abused: Not on file     Physically abused: Not on file     Forced sexual activity: Not on file   Other Topics Concern    Not on file   Social History Narrative    Not on file     No current facility-administered medications for this encounter. Current Outpatient Medications   Medication Sig Dispense Refill    hydrOXYzine (VISTARIL) 50 MG capsule Take 50 mg by mouth 2 times daily as needed for Itching      sertraline (ZOLOFT) 100 MG tablet Take 1 tablet by mouth daily 30 tablet 0    hydrOXYzine (VISTARIL) 25 MG capsule Take 2 capsules by mouth 3 times daily as needed for Anxiety (Patient taking differently: Take 50 mg by mouth 2 times daily ) 30 capsule 0    ferrous sulfate 27 MG TABS Take by mouth daily      Prenatal Vit-Fe Fumarate-FA (PRENATAL PO) Take by mouth       Allergies   Allergen Reactions    Latex Itching and Swelling    Monistat [Miconazole] Dermatitis       REVIEW OF SYSTEMS  10 systems reviewed, pertinent positives per HPI otherwise noted to be negative. PHYSICAL EXAM  /73   Pulse 83   Temp 98.1 °F (36.7 °C) (Oral)   Resp 15   Ht 5' (1.524 m)   Wt 100 lb (45.4 kg)   LMP 07/25/2019   SpO2 100%   BMI 19.53 kg/m²    GENERAL APPEARANCE: Awake and alert. Cooperative. No acute distress. HENT: Normocephalic. Atraumatic. Mucous membranes are moist.  No drooling or stridor. NECK: Supple. EYES: PERRL. EOM's grossly intact. HEART/CHEST: RRR.  No Anion Gap 12 3 - 16    Glucose 93 70 - 99 mg/dL    BUN 9 7 - 20 mg/dL    CREATININE <0.5 (L) 0.6 - 1.1 mg/dL    GFR Non-African American >60 >60    GFR African American >60 >60    Calcium 9.7 8.3 - 10.6 mg/dL    Total Protein 8.0 6.4 - 8.2 g/dL    Alb 5.2 (H) 3.4 - 5.0 g/dL    Albumin/Globulin Ratio 1.9 1.1 - 2.2    Total Bilirubin 0.5 0.0 - 1.0 mg/dL    Alkaline Phosphatase 45 40 - 129 U/L    ALT 7 (L) 10 - 40 U/L    AST 12 (L) 15 - 37 U/L    Globulin 2.8 g/dL   Urinalysis Reflex to Culture   Result Value Ref Range    Color, UA Yellow Straw/Yellow    Clarity, UA Clear Clear    Glucose, Ur Negative Negative mg/dL    Bilirubin Urine Negative Negative    Ketones, Urine Negative Negative mg/dL    Specific Gravity, UA 1.025 1.005 - 1.030    Blood, Urine Negative Negative    pH, UA 6.5 5.0 - 8.0    Protein, UA Negative Negative mg/dL    Urobilinogen, Urine 0.2 <2.0 E.U./dL    Nitrite, Urine Negative Negative    Leukocyte Esterase, Urine Negative Negative    Microscopic Examination Not Indicated     Urine Reflex to Culture Not Indicated     Urine Type Not Specified    Ethanol   Result Value Ref Range    Ethanol Lvl None Detected mg/dL   Drug screen multi urine   Result Value Ref Range    Amphetamine Screen, Urine Neg Negative <1000ng/mL    Barbiturate Screen, Ur Neg Negative <200 ng/mL    Benzodiazepine Screen, Urine Neg Negative <200 ng/mL    Cannabinoid Scrn, Ur POSITIVE (A) Negative <50 ng/mL    Cocaine Metabolite Screen, Urine Neg Negative <300 ng/mL    Opiate Scrn, Ur Neg Negative <300 ng/mL    PCP Screen, Urine Neg Negative <25 ng/mL    Methadone Screen, Urine Neg Negative <300 ng/mL    Propoxyphene Scrn, Ur Neg Negative <300 ng/mL    Oxycodone Urine Neg Negative <100 ng/ml    pH, UA 6.5     Drug Screen Comment: see below    Pregnancy, Urine   Result Value Ref Range    HCG(Urine) Pregnancy Test POSITIVE Detects HCG level >20 MIU/mL       RADIOLOGY  None     ED COURSE/MDM  Patient seen and evaluated.  Old records

## 2019-09-21 NOTE — ED TRIAGE NOTES
medical illness   []  Social isolation   []  History of violence   []  Active psychosis   []  Cognitive impairment    []  No outpatient services in place   []  Medication noncompliance   []  No collateral information to support safety      PROTECTIVE FACTORS:  [x] Age >25 and <55  [x] Female gender   [] Denies depression  [x] Denies suicidal ideation  [x] Does not have lethal plan   [x] Does not have access to guns or weapons  [x] Patient is verbally robert for safety  [] No prior suicide attempts  [] No active substance abuse  [x] Patient has social or family support  [x] No active psychosis or cognitive dysfunction  [x] Physically healthy  [x] Has outpatient services in place  [x] Compliant with recommended medications  [x] Collateral information from Scott Medina (best friends Mom & living with) supports patient safety   [x] Patient is future oriented with plans to finish outpatient therapy and 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 Clinical Summary:    Patient presents to [facility] on a SOB after cutting self, mom believes attempting to kill self. Patient was clinically sober at the time of the evaluation. Patient was evaluated and offered supportive counseling.

## 2019-09-21 NOTE — ED NOTES
Collateral Contact:  Name: Armen Cardenas  Relation to Patient: Best friend's Mother  Last Contact with Patient: This morning 9/21/2019  Concerns: Hieu Yu is not concerned with patient harming self, she does have concern with patient continuing to talk with her mother.   Hieu Yu is supportive of plan to discharge [facility] [address] [facility]  09/21/19 6622

## 2019-09-23 ENCOUNTER — HOSPITAL ENCOUNTER (OUTPATIENT)
Dept: PSYCHIATRY | Age: 24
Setting detail: THERAPIES SERIES
Discharge: HOME | End: 2019-09-23

## 2019-09-23 VITALS — HEART RATE: 64 BPM | SYSTOLIC BLOOD PRESSURE: 110 MMHG | DIASTOLIC BLOOD PRESSURE: 60 MMHG

## 2019-09-23 DIAGNOSIS — F33.1 MODERATE EPISODE OF RECURRENT MAJOR DEPRESSIVE DISORDER (HCC): ICD-10-CM

## 2019-09-23 PROCEDURE — G0410 GRP PSYCH PARTIAL HOSP 45-50: HCPCS | Performed by: COUNSELOR

## 2019-09-23 PROCEDURE — G0410 GRP PSYCH PARTIAL HOSP 45-50: HCPCS

## 2019-09-24 ENCOUNTER — HOSPITAL ENCOUNTER (OUTPATIENT)
Dept: PSYCHIATRY | Age: 24
Setting detail: THERAPIES SERIES
Discharge: HOME | End: 2019-09-24

## 2019-09-24 DIAGNOSIS — F43.23 ADJUSTMENT DISORDER WITH MIXED ANXIETY AND DEPRESSED MOOD: ICD-10-CM

## 2019-09-24 PROCEDURE — G0410 GRP PSYCH PARTIAL HOSP 45-50: HCPCS

## 2019-09-24 PROCEDURE — G0410 GRP PSYCH PARTIAL HOSP 45-50: HCPCS | Performed by: COUNSELOR

## 2019-09-25 ENCOUNTER — HOSPITAL ENCOUNTER (EMERGENCY)
Age: 24
Discharge: HOME | End: 2019-09-25
Attending: EMERGENCY MEDICINE
Payer: COMMERCIAL

## 2019-09-25 VITALS
OXYGEN SATURATION: 100 % | SYSTOLIC BLOOD PRESSURE: 106 MMHG | BODY MASS INDEX: 19.63 KG/M2 | RESPIRATION RATE: 16 BRPM | WEIGHT: 100 LBS | HEART RATE: 75 BPM | TEMPERATURE: 98.4 F | DIASTOLIC BLOOD PRESSURE: 70 MMHG | HEIGHT: 60 IN

## 2019-09-25 DIAGNOSIS — R45.87 IMPULSIVENESS: ICD-10-CM

## 2019-09-25 DIAGNOSIS — N30.00 ACUTE CYSTITIS WITHOUT HEMATURIA: Primary | ICD-10-CM

## 2019-09-25 LAB
A/G RATIO: 1.7 (ref 1.1–2.2)
ACETAMINOPHEN LEVEL: <5 UG/ML (ref 10–30)
ALBUMIN SERPL-MCNC: 5.1 G/DL (ref 3.4–5)
ALP BLD-CCNC: 48 U/L (ref 40–129)
ALT SERPL-CCNC: 8 U/L (ref 10–40)
AMPHETAMINE SCREEN, URINE: ABNORMAL
ANION GAP SERPL CALCULATED.3IONS-SCNC: 13 MMOL/L (ref 3–16)
AST SERPL-CCNC: 16 U/L (ref 15–37)
BACTERIA: ABNORMAL /HPF
BARBITURATE SCREEN URINE: ABNORMAL
BASOPHILS ABSOLUTE: 0 K/UL (ref 0–0.2)
BASOPHILS RELATIVE PERCENT: 0.3 %
BENZODIAZEPINE SCREEN, URINE: ABNORMAL
BILIRUB SERPL-MCNC: 1.1 MG/DL (ref 0–1)
BILIRUBIN URINE: NEGATIVE
BLOOD, URINE: NEGATIVE
BUN BLDV-MCNC: 11 MG/DL (ref 7–20)
CALCIUM SERPL-MCNC: 9.7 MG/DL (ref 8.3–10.6)
CANNABINOID SCREEN URINE: POSITIVE
CHLORIDE BLD-SCNC: 102 MMOL/L (ref 99–110)
CLARITY: CLEAR
CO2: 21 MMOL/L (ref 21–32)
COCAINE METABOLITE SCREEN URINE: ABNORMAL
COLOR: YELLOW
CREAT SERPL-MCNC: <0.5 MG/DL (ref 0.6–1.1)
EOSINOPHILS ABSOLUTE: 0 K/UL (ref 0–0.6)
EOSINOPHILS RELATIVE PERCENT: 0.1 %
EPITHELIAL CELLS, UA: ABNORMAL /HPF
ETHANOL: NORMAL MG/DL (ref 0–0.08)
GFR AFRICAN AMERICAN: >60
GFR NON-AFRICAN AMERICAN: >60
GLOBULIN: 3 G/DL
GLUCOSE BLD-MCNC: 84 MG/DL (ref 70–99)
GLUCOSE URINE: NEGATIVE MG/DL
GONADOTROPIN, CHORIONIC (HCG) QUANT: NORMAL MIU/ML
HCT VFR BLD CALC: 38.2 % (ref 36–48)
HEMOGLOBIN: 12.9 G/DL (ref 12–16)
KETONES, URINE: >=80 MG/DL
LEUKOCYTE ESTERASE, URINE: ABNORMAL
LIPASE: 19 U/L (ref 13–60)
LYMPHOCYTES ABSOLUTE: 1.1 K/UL (ref 1–5.1)
LYMPHOCYTES RELATIVE PERCENT: 10.3 %
Lab: ABNORMAL
MCH RBC QN AUTO: 30.3 PG (ref 26–34)
MCHC RBC AUTO-ENTMCNC: 33.7 G/DL (ref 31–36)
MCV RBC AUTO: 90 FL (ref 80–100)
METHADONE SCREEN, URINE: ABNORMAL
MICROSCOPIC EXAMINATION: YES
MONOCYTES ABSOLUTE: 0.4 K/UL (ref 0–1.3)
MONOCYTES RELATIVE PERCENT: 3.9 %
MUCUS: ABNORMAL /LPF
NEUTROPHILS ABSOLUTE: 8.8 K/UL (ref 1.7–7.7)
NEUTROPHILS RELATIVE PERCENT: 85.4 %
NITRITE, URINE: POSITIVE
OPIATE SCREEN URINE: ABNORMAL
OXYCODONE URINE: ABNORMAL
PDW BLD-RTO: 14.2 % (ref 12.4–15.4)
PH UA: 6
PH UA: 6 (ref 5–8)
PHENCYCLIDINE SCREEN URINE: ABNORMAL
PLATELET # BLD: 269 K/UL (ref 135–450)
PMV BLD AUTO: 7.5 FL (ref 5–10.5)
POTASSIUM REFLEX MAGNESIUM: 3.6 MMOL/L (ref 3.5–5.1)
PROPOXYPHENE SCREEN: ABNORMAL
PROTEIN UA: 30 MG/DL
RBC # BLD: 4.24 M/UL (ref 4–5.2)
RBC UA: ABNORMAL /HPF (ref 0–2)
SALICYLATE, SERUM: <0.3 MG/DL (ref 15–30)
SODIUM BLD-SCNC: 136 MMOL/L (ref 136–145)
SPECIFIC GRAVITY UA: >=1.03 (ref 1–1.03)
TOTAL PROTEIN: 8.1 G/DL (ref 6.4–8.2)
URINE REFLEX TO CULTURE: YES
URINE TYPE: ABNORMAL
UROBILINOGEN, URINE: 0.2 E.U./DL
WBC # BLD: 10.3 K/UL (ref 4–11)
WBC UA: ABNORMAL /HPF (ref 0–5)

## 2019-09-25 PROCEDURE — 87077 CULTURE AEROBIC IDENTIFY: CPT

## 2019-09-25 PROCEDURE — 80307 DRUG TEST PRSMV CHEM ANLYZR: CPT

## 2019-09-25 PROCEDURE — 81001 URINALYSIS AUTO W/SCOPE: CPT

## 2019-09-25 PROCEDURE — 83690 ASSAY OF LIPASE: CPT

## 2019-09-25 PROCEDURE — 99285 EMERGENCY DEPT VISIT HI MDM: CPT

## 2019-09-25 PROCEDURE — G0480 DRUG TEST DEF 1-7 CLASSES: HCPCS

## 2019-09-25 PROCEDURE — 6370000000 HC RX 637 (ALT 250 FOR IP): Performed by: NURSE PRACTITIONER

## 2019-09-25 PROCEDURE — 85025 COMPLETE CBC W/AUTO DIFF WBC: CPT

## 2019-09-25 PROCEDURE — 84702 CHORIONIC GONADOTROPIN TEST: CPT

## 2019-09-25 PROCEDURE — 87186 SC STD MICRODIL/AGAR DIL: CPT

## 2019-09-25 PROCEDURE — 2580000003 HC RX 258: Performed by: NURSE PRACTITIONER

## 2019-09-25 PROCEDURE — 87086 URINE CULTURE/COLONY COUNT: CPT

## 2019-09-25 PROCEDURE — 96360 HYDRATION IV INFUSION INIT: CPT

## 2019-09-25 PROCEDURE — 80053 COMPREHEN METABOLIC PANEL: CPT

## 2019-09-25 RX ORDER — NITROFURANTOIN 25; 75 MG/1; MG/1
100 CAPSULE ORAL 2 TIMES DAILY
Qty: 20 CAPSULE | Refills: 0 | Status: SHIPPED | OUTPATIENT
Start: 2019-09-25 | End: 2019-10-05

## 2019-09-25 RX ORDER — 0.9 % SODIUM CHLORIDE 0.9 %
1000 INTRAVENOUS SOLUTION INTRAVENOUS ONCE
Status: COMPLETED | OUTPATIENT
Start: 2019-09-25 | End: 2019-09-25

## 2019-09-25 RX ORDER — NITROFURANTOIN 25; 75 MG/1; MG/1
100 CAPSULE ORAL ONCE
Status: COMPLETED | OUTPATIENT
Start: 2019-09-25 | End: 2019-09-25

## 2019-09-25 RX ADMIN — NITROFURANTOIN (MONOHYDRATE/MACROCRYSTALS) 100 MG: 75; 25 CAPSULE ORAL at 17:47

## 2019-09-25 RX ADMIN — SODIUM CHLORIDE 1000 ML: 9 INJECTION, SOLUTION INTRAVENOUS at 16:29

## 2019-09-25 NOTE — ED NOTES
Writer spoke with pt's mother, Governor Veronica, to verify that 11 month old child was safe and with pt's mother. Pt's mother reports that she picked child up from  and now has child and reports he is safe.      [address] [place] [address]  09/25/19 5172
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
## 2019-09-25 NOTE — ED NOTES
Pt placed in blue gown, all belongings collected and put in a bag, pt placed on cardiac monitor. Will continue to monitor.       Parveen Gaffney RN  09/25/19 0024

## 2019-09-25 NOTE — ED NOTES
AJ Raines at bed side evaluating pt at this time. Will continue to monitor.       Rosalio Pagan RN  09/25/19 0551

## 2019-09-26 ENCOUNTER — HOSPITAL ENCOUNTER (OUTPATIENT)
Dept: PSYCHIATRY | Age: 24
Setting detail: THERAPIES SERIES
Discharge: HOME | End: 2019-09-26
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
## 2019-09-27 ENCOUNTER — APPOINTMENT (OUTPATIENT)
Dept: PSYCHIATRY | Age: 24
End: 2019-09-27

## 2019-09-27 LAB
ORGANISM: ABNORMAL
URINE CULTURE, ROUTINE: ABNORMAL

## 2019-09-30 ENCOUNTER — TELEPHONE (OUTPATIENT)
Dept: FAMILY MEDICINE CLINIC | Age: 24
End: 2019-09-30

## 2019-09-30 ENCOUNTER — HOSPITAL ENCOUNTER (OUTPATIENT)
Dept: PSYCHIATRY | Age: 24
Setting detail: THERAPIES SERIES
Discharge: HOME | End: 2019-09-30

## 2019-10-08 ENCOUNTER — HOSPITAL ENCOUNTER (OUTPATIENT)
Dept: PSYCHIATRY | Age: 24
Setting detail: THERAPIES SERIES
Discharge: HOME | End: 2019-10-08
Payer: COMMERCIAL

## 2019-10-17 ENCOUNTER — TELEPHONE (OUTPATIENT)
Dept: FAMILY MEDICINE CLINIC | Age: 24
End: 2019-10-17

## 2019-10-22 ENCOUNTER — HOSPITAL ENCOUNTER (OUTPATIENT)
Dept: PSYCHIATRY | Age: 24
Setting detail: THERAPIES SERIES
Discharge: HOME | End: 2019-10-22
Payer: COMMERCIAL

## 2019-10-22 PROCEDURE — 90853 GROUP PSYCHOTHERAPY: CPT | Performed by: COUNSELOR

## 2019-10-25 ENCOUNTER — HOSPITAL ENCOUNTER (OUTPATIENT)
Dept: PSYCHIATRY | Age: 24
Setting detail: THERAPIES SERIES
Discharge: HOME | End: 2019-10-25
Payer: COMMERCIAL

## 2019-10-28 ENCOUNTER — APPOINTMENT (OUTPATIENT)
Dept: PSYCHIATRY | Age: 24
End: 2019-10-28
Payer: COMMERCIAL

## 2019-10-31 ENCOUNTER — APPOINTMENT (OUTPATIENT)
Dept: PSYCHIATRY | Age: 24
End: 2019-10-31
Payer: COMMERCIAL

## 2020-02-21 ENCOUNTER — HOSPITAL ENCOUNTER (EMERGENCY)
Age: 25
Discharge: HOME | End: 2020-02-22
Payer: COMMERCIAL

## 2020-02-21 LAB
A/G RATIO: 1.4 (ref 1.1–2.2)
ALBUMIN SERPL-MCNC: 4.3 G/DL (ref 3.4–5)
ALP BLD-CCNC: 83 U/L (ref 40–129)
ALT SERPL-CCNC: <5 U/L (ref 10–40)
ANION GAP SERPL CALCULATED.3IONS-SCNC: 12 MMOL/L (ref 3–16)
AST SERPL-CCNC: 16 U/L (ref 15–37)
BASOPHILS ABSOLUTE: 0 K/UL (ref 0–0.2)
BASOPHILS RELATIVE PERCENT: 0.2 %
BILIRUB SERPL-MCNC: <0.2 MG/DL (ref 0–1)
BILIRUBIN URINE: NEGATIVE
BLOOD, URINE: NEGATIVE
BUN BLDV-MCNC: 8 MG/DL (ref 7–20)
CALCIUM SERPL-MCNC: 9 MG/DL (ref 8.3–10.6)
CHLORIDE BLD-SCNC: 98 MMOL/L (ref 99–110)
CLARITY: CLEAR
CO2: 22 MMOL/L (ref 21–32)
COLOR: YELLOW
CREAT SERPL-MCNC: <0.5 MG/DL (ref 0.6–1.1)
EOSINOPHILS ABSOLUTE: 0 K/UL (ref 0–0.6)
EOSINOPHILS RELATIVE PERCENT: 0.1 %
GFR AFRICAN AMERICAN: >60
GFR NON-AFRICAN AMERICAN: >60
GLOBULIN: 3.1 G/DL
GLUCOSE BLD-MCNC: 90 MG/DL (ref 70–99)
GLUCOSE URINE: NEGATIVE MG/DL
HCT VFR BLD CALC: 33.2 % (ref 36–48)
HEMOGLOBIN: 11.2 G/DL (ref 12–16)
KETONES, URINE: NEGATIVE MG/DL
LEUKOCYTE ESTERASE, URINE: NEGATIVE
LYMPHOCYTES ABSOLUTE: 1.7 K/UL (ref 1–5.1)
LYMPHOCYTES RELATIVE PERCENT: 16.4 %
MCH RBC QN AUTO: 30 PG (ref 26–34)
MCHC RBC AUTO-ENTMCNC: 33.6 G/DL (ref 31–36)
MCV RBC AUTO: 89.3 FL (ref 80–100)
MICROSCOPIC EXAMINATION: NORMAL
MONOCYTES ABSOLUTE: 0.6 K/UL (ref 0–1.3)
MONOCYTES RELATIVE PERCENT: 5.8 %
NEUTROPHILS ABSOLUTE: 8 K/UL (ref 1.7–7.7)
NEUTROPHILS RELATIVE PERCENT: 77.5 %
NITRITE, URINE: NEGATIVE
PDW BLD-RTO: 14.1 % (ref 12.4–15.4)
PH UA: 7 (ref 5–8)
PLATELET # BLD: 241 K/UL (ref 135–450)
PMV BLD AUTO: 7.9 FL (ref 5–10.5)
POTASSIUM SERPL-SCNC: 3.9 MMOL/L (ref 3.5–5.1)
PROTEIN UA: NEGATIVE MG/DL
RAPID INFLUENZA  B AGN: NEGATIVE
RAPID INFLUENZA A AGN: NEGATIVE
RBC # BLD: 3.72 M/UL (ref 4–5.2)
S PYO AG THROAT QL: NEGATIVE
SODIUM BLD-SCNC: 132 MMOL/L (ref 136–145)
SPECIFIC GRAVITY UA: 1.02 (ref 1–1.03)
TOTAL PROTEIN: 7.4 G/DL (ref 6.4–8.2)
URINE REFLEX TO CULTURE: NORMAL
URINE TYPE: NORMAL
UROBILINOGEN, URINE: 0.2 E.U./DL
WBC # BLD: 10.3 K/UL (ref 4–11)

## 2020-02-21 PROCEDURE — 81003 URINALYSIS AUTO W/O SCOPE: CPT

## 2020-02-21 PROCEDURE — 87880 STREP A ASSAY W/OPTIC: CPT

## 2020-02-21 PROCEDURE — 80053 COMPREHEN METABOLIC PANEL: CPT

## 2020-02-21 PROCEDURE — 87081 CULTURE SCREEN ONLY: CPT

## 2020-02-21 PROCEDURE — 85025 COMPLETE CBC W/AUTO DIFF WBC: CPT

## 2020-02-21 PROCEDURE — 2580000003 HC RX 258: Performed by: NURSE PRACTITIONER

## 2020-02-21 PROCEDURE — 99284 EMERGENCY DEPT VISIT MOD MDM: CPT

## 2020-02-21 PROCEDURE — 87804 INFLUENZA ASSAY W/OPTIC: CPT

## 2020-02-21 RX ORDER — LANOLIN ALCOHOL/MO/W.PET/CERES
50 CREAM (GRAM) TOPICAL ONCE
Status: COMPLETED | OUTPATIENT
Start: 2020-02-22 | End: 2020-02-22

## 2020-02-21 RX ORDER — 0.9 % SODIUM CHLORIDE 0.9 %
1000 INTRAVENOUS SOLUTION INTRAVENOUS ONCE
Status: COMPLETED | OUTPATIENT
Start: 2020-02-21 | End: 2020-02-22

## 2020-02-21 RX ADMIN — SODIUM CHLORIDE 1000 ML: 9 INJECTION, SOLUTION INTRAVENOUS at 23:53

## 2020-02-21 ASSESSMENT — PAIN DESCRIPTION - PAIN TYPE: TYPE: ACUTE PAIN

## 2020-02-21 ASSESSMENT — PAIN SCALES - GENERAL: PAINLEVEL_OUTOF10: 5

## 2020-02-21 ASSESSMENT — PAIN DESCRIPTION - LOCATION: LOCATION: THROAT

## 2020-02-21 ASSESSMENT — PAIN DESCRIPTION - DESCRIPTORS: DESCRIPTORS: SORE

## 2020-02-22 VITALS
OXYGEN SATURATION: 97 % | RESPIRATION RATE: 18 BRPM | HEART RATE: 91 BPM | BODY MASS INDEX: 23.56 KG/M2 | HEIGHT: 60 IN | SYSTOLIC BLOOD PRESSURE: 123 MMHG | WEIGHT: 120 LBS | DIASTOLIC BLOOD PRESSURE: 54 MMHG | TEMPERATURE: 97.6 F

## 2020-02-22 PROCEDURE — 6370000000 HC RX 637 (ALT 250 FOR IP): Performed by: NURSE PRACTITIONER

## 2020-02-22 RX ADMIN — PYRIDOXINE HCL TAB 50 MG 50 MG: 50 TAB at 00:36

## 2020-02-22 ASSESSMENT — ENCOUNTER SYMPTOMS
ABDOMINAL PAIN: 0
RHINORRHEA: 0
SHORTNESS OF BREATH: 0
SORE THROAT: 0
NAUSEA: 1
DIARRHEA: 1
VOMITING: 1
COLOR CHANGE: 0

## 2020-02-22 NOTE — ED PROVIDER NOTES
Evaluated by [id number] [facility] Provider          [facility] ED  EMERGENCY DEPARTMENT ENCOUNTER        Pt Name: Analisa Durand  MRN: [id number]  [place] 1995  Dateof evaluation: 2/21/2020  Provider: KAITY Kearns CNP  PCP: KAITY Rudd CNP  ED Attending: No att. providers found    [address] [place] [address]       Chief Complaint   Patient presents with    Emesis     c/o nausea, vomiting, diarrhea x 2 days, with chills, ear fullness and sore throat. 30 weeks pregnant. HISTORY OF PRESENTILLNESS   (Location/Symptom, Timing/Onset, Context/Setting, Quality, Duration, Modifying Factors, Severity)  Note limiting factors. Analisa Durand is a 22 y.o. female for nausea and vomiting. Onset was 2 days. Duration has been since the onset. Context includes patient reports that she has had nausea vomiting and diarrhea for the last 2 days. She also complains of chills sore throat and ear fullness. Alleviating factors include nothing. Aggravating factors include nothing. Pain is 5/10. Nothing has been used for pain today. Nursing Notes were all reviewed and agreed with or any disagreements were addressed  in the HPI. REVIEW OF SYSTEMS    (2-9 systems for level 4, 10 or more for level 5)     Review of Systems   Constitutional: Positive for fatigue. Negative for fever. HENT: Negative for congestion, rhinorrhea and sore throat. Ear fullness   Respiratory: Negative for shortness of breath. Cardiovascular: Negative for chest pain. Gastrointestinal: Positive for diarrhea, nausea and vomiting. Negative for abdominal pain. Genitourinary: Negative for decreased urine volume, difficulty urinating, vaginal bleeding, vaginal discharge and vaginal pain. Musculoskeletal: Positive for myalgias. Negative for arthralgias. Skin: Negative for color change and rash. Neurological: Negative for dizziness and light-headedness.    Psychiatric/Behavioral: Negative for is warm and dry. Neurological:      Mental Status: She is alert and oriented to person, place, and time. DIAGNOSTIC RESULTS   LABS:    Labs Reviewed   CBC WITH AUTO DIFFERENTIAL - Abnormal; Notable for the following components:       Result Value    RBC 3.72 (*)     Hemoglobin 11.2 (*)     Hematocrit 33.2 (*)     Neutrophils Absolute 8.0 (*)     All other components within normal limits    Narrative:     Performed at:  [facility],  [employer], [facility]   Phone [phone]   COMPREHENSIVE METABOLIC PANEL - Abnormal; Notable for the following components:    Sodium 132 (*)     Chloride 98 (*)     CREATININE <0.5 (*)     ALT <5 (*)     All other components within normal limits    Narrative:     Performed at:  [facility],  [employer] [employer], [facility]   Phone [phone]   RAPID INFLUENZA A/B ANTIGENS    Narrative:     Performed at:  [place] [address],  [employer], [facility]   Phone [phone] A THROAT    Narrative:     Performed at:  [facility]) [facility],  [place], [facility]   Phone [phone]   CULTURE, BETA STREP CONFIRM PLATES   URINE RT REFLEX TO CULTURE    Narrative:     Performed at:  [facility],  [employer] [employer], [place] [employer]   Phone [phone]       All other labs werewithin normal range or not returned as of this dictation. EKG: All EKG's are interpreted by the Emergency Department Physician who either signs or Co-signs this chart in the absence of acardiologist.  Please see their note for interpretation of EKG. RADIOLOGY:           Interpretation per the Radiologist below, if available at the time of this note:    No orders to display     No results found.       PROCEDURES   Unless otherwise noted below, none     Procedures     CRITICAL CARE TIME   N/A    CONSULTS:  None      EMERGENCYDEPARTMENT COURSE and DIFFERENTIAL DIAGNOSIS/MDM:   Vitals:    Vitals:    02/21/20 2218 02/21/20 2316 02/22/20 0039   BP: 108/70 111/64 (!) 123/54   Pulse: 95 96 91   Resp: 16 16 18   Temp: 97.6 °F (36.4 °C)     TempSrc: Temporal     SpO2: 99% 99% 97%   Weight: 120 lb (54.4 kg)     Height: 5' (1.524 m)         Patient was given the following medications:  Medications   0.9 % sodium chloride bolus (0 mLs Intravenous Stopped 2/22/20 0038)   vitamin B-6 (PYRIDOXINE) tablet 50 mg (50 mg Oral Given 2/22/20 0036)       Patient was seen and evaluated by myself. Patient here for complaints of nausea vomiting diarrhea ear pain and a sore throat. Patient reports that she is approximately 3 weeks pregnant. She is a G3, P2. She denies any abdominal pain vaginal pain or vaginal bleeding. Patient follows up with OB at [facility]. On exam she is awake and alert hemodynamically stable nontoxic in appearance. Patient was provided with IV fluids and nausea medications in the ED. Lab values have been reviewed and interpreted. Fetal heart tones were noted to be in the 130s. On reevaluation the patient states that she feels better. She be discharged home with instructions to follow-up with her primary care doctor and her OB/GYN in the next 2 days for reevaluation. She was encouraged to return to the ED for worsening symptoms. Patient was ultimately discharged home with all questions answered. The patient tolerated their visit well. I have evaluated thispatient. My supervising physician was available for consultation. The patient and / or the family were informed of the results of any tests, a time was given to answer questions, a plan was proposed and they agreed Laura [place]. FINAL IMPRESSION      1. Nausea and vomiting, intractability of vomiting not specified, unspecified vomiting type    2. Diarrhea, unspecified type    3. Flu-like symptoms    4.  Pregnancy,

## 2020-02-22 NOTE — ED NOTES
Patient up to restroom at this time to provide urine specimen.       Silvio Collins RN  02/21/20 8677

## 2020-02-23 LAB — S PYO THROAT QL CULT: NORMAL

## 2020-05-12 ENCOUNTER — HOSPITAL ENCOUNTER (EMERGENCY)
Age: 25
Discharge: HOME | End: 2020-05-12
Attending: EMERGENCY MEDICINE
Payer: COMMERCIAL

## 2020-05-12 VITALS
TEMPERATURE: 98.3 F | OXYGEN SATURATION: 100 % | DIASTOLIC BLOOD PRESSURE: 89 MMHG | WEIGHT: 110 LBS | BODY MASS INDEX: 21.6 KG/M2 | SYSTOLIC BLOOD PRESSURE: 156 MMHG | RESPIRATION RATE: 16 BRPM | HEIGHT: 60 IN | HEART RATE: 85 BPM

## 2020-05-12 LAB
A/G RATIO: 1.7 (ref 1.1–2.2)
ACETAMINOPHEN LEVEL: <5 UG/ML (ref 10–30)
ALBUMIN SERPL-MCNC: 4.6 G/DL (ref 3.4–5)
ALP BLD-CCNC: 81 U/L (ref 40–129)
ALT SERPL-CCNC: 9 U/L (ref 10–40)
AMPHETAMINE SCREEN, URINE: ABNORMAL
ANION GAP SERPL CALCULATED.3IONS-SCNC: 14 MMOL/L (ref 3–16)
AST SERPL-CCNC: 18 U/L (ref 15–37)
BARBITURATE SCREEN URINE: ABNORMAL
BASOPHILS ABSOLUTE: 0 K/UL (ref 0–0.2)
BASOPHILS RELATIVE PERCENT: 1 %
BENZODIAZEPINE SCREEN, URINE: ABNORMAL
BILIRUB SERPL-MCNC: 0.3 MG/DL (ref 0–1)
BUN BLDV-MCNC: 7 MG/DL (ref 7–20)
CALCIUM SERPL-MCNC: 9.4 MG/DL (ref 8.3–10.6)
CANNABINOID SCREEN URINE: POSITIVE
CHLORIDE BLD-SCNC: 104 MMOL/L (ref 99–110)
CO2: 23 MMOL/L (ref 21–32)
COCAINE METABOLITE SCREEN URINE: ABNORMAL
CREAT SERPL-MCNC: 0.6 MG/DL (ref 0.6–1.1)
EOSINOPHILS ABSOLUTE: 0 K/UL (ref 0–0.6)
EOSINOPHILS RELATIVE PERCENT: 1.1 %
ETHANOL: NORMAL MG/DL (ref 0–0.08)
GFR AFRICAN AMERICAN: >60
GFR NON-AFRICAN AMERICAN: >60
GLOBULIN: 2.7 G/DL
GLUCOSE BLD-MCNC: 92 MG/DL (ref 70–99)
HCG(URINE) PREGNANCY TEST: NEGATIVE
HCT VFR BLD CALC: 31.3 % (ref 36–48)
HEMOGLOBIN: 10.2 G/DL (ref 12–16)
LYMPHOCYTES ABSOLUTE: 1.5 K/UL (ref 1–5.1)
LYMPHOCYTES RELATIVE PERCENT: 33 %
Lab: ABNORMAL
MCH RBC QN AUTO: 27.1 PG (ref 26–34)
MCHC RBC AUTO-ENTMCNC: 32.5 G/DL (ref 31–36)
MCV RBC AUTO: 83.4 FL (ref 80–100)
METHADONE SCREEN, URINE: ABNORMAL
MONOCYTES ABSOLUTE: 0.2 K/UL (ref 0–1.3)
MONOCYTES RELATIVE PERCENT: 4.5 %
NEUTROPHILS ABSOLUTE: 2.7 K/UL (ref 1.7–7.7)
NEUTROPHILS RELATIVE PERCENT: 60.4 %
OPIATE SCREEN URINE: ABNORMAL
OXYCODONE URINE: ABNORMAL
PDW BLD-RTO: 15.1 % (ref 12.4–15.4)
PH UA: 5
PHENCYCLIDINE SCREEN URINE: ABNORMAL
PLATELET # BLD: 278 K/UL (ref 135–450)
PMV BLD AUTO: 7.7 FL (ref 5–10.5)
POTASSIUM REFLEX MAGNESIUM: 4.2 MMOL/L (ref 3.5–5.1)
PROPOXYPHENE SCREEN: ABNORMAL
RBC # BLD: 3.75 M/UL (ref 4–5.2)
SALICYLATE, SERUM: <0.3 MG/DL (ref 15–30)
SODIUM BLD-SCNC: 141 MMOL/L (ref 136–145)
TOTAL PROTEIN: 7.3 G/DL (ref 6.4–8.2)
WBC # BLD: 4.5 K/UL (ref 4–11)

## 2020-05-12 PROCEDURE — 99284 EMERGENCY DEPT VISIT MOD MDM: CPT

## 2020-05-12 PROCEDURE — 85025 COMPLETE CBC W/AUTO DIFF WBC: CPT

## 2020-05-12 PROCEDURE — 80053 COMPREHEN METABOLIC PANEL: CPT

## 2020-05-12 PROCEDURE — G0480 DRUG TEST DEF 1-7 CLASSES: HCPCS

## 2020-05-12 PROCEDURE — 84703 CHORIONIC GONADOTROPIN ASSAY: CPT

## 2020-05-12 PROCEDURE — 80307 DRUG TEST PRSMV CHEM ANLYZR: CPT

## 2020-05-12 RX ORDER — SERTRALINE HYDROCHLORIDE 100 MG/1
100 TABLET, FILM COATED ORAL DAILY
Qty: 14 TABLET | Refills: 0 | Status: SHIPPED | OUTPATIENT
Start: 2020-05-12 | End: 2020-06-19

## 2020-05-12 RX ORDER — HYDROXYZINE PAMOATE 25 MG/1
25 CAPSULE ORAL 2 TIMES DAILY PRN
Qty: 28 CAPSULE | Refills: 0 | Status: SHIPPED | OUTPATIENT
Start: 2020-05-12 | End: 2020-05-26

## 2020-05-12 ASSESSMENT — ENCOUNTER SYMPTOMS
ABDOMINAL PAIN: 0
VOMITING: 0
COUGH: 0
RHINORRHEA: 0
WHEEZING: 0
TROUBLE SWALLOWING: 0
SORE THROAT: 0
DIARRHEA: 0
CHEST TIGHTNESS: 0
STRIDOR: 0
SHORTNESS OF BREATH: 0

## 2020-05-12 NOTE — ED NOTES
Patient has order to discharge home. Patient given discharge instructions. Patient states she understands. Patient left to lobby to meet .       Morenita Dyer RN  05/12/20 2135

## 2020-05-12 NOTE — ED NOTES
Level of Care Disposition: Discharge    Patient was seen by ED provider and [facility] staff. The case was presented to psychiatric provider on-call Dr. Candido Ley. Based on the ED evaluation and information presented to the provider by this the decision was made to discharge patient with the following referrals: IOP for bridge appointment, [facility], and Dr. Leta Espinal. RATIONALE FOR NON-ADMISSION:  The patient does not meet criteria for an involuntary psychiatric admission because she does not present as an imminent risk to herself or another person. She denies all SI, HI, plan, and intent. 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 She also identifies her children as reasons to live. Collateral was obtained from pt's significant other, William Persaud, who states he feels safe with pt returning home and verifies there are no guns in the home. Patient has demonstrated a reasonable safety plan to return home with William Persaud and follow up in [facility].            [address] [place] [address]  05/12/20 9140

## 2020-05-12 NOTE — ED PROVIDER NOTES
Spouse name: None    Number of children: None    Years of education: None    Highest education level: None   Occupational History    None   Social Needs    Financial resource strain: None    Food insecurity     Worry: None     Inability: None    Transportation needs     Medical: None     Non-medical: None   Tobacco Use    Smoking status: Current Every Day Smoker     Packs/day: 0.25     Years: 10.00     Pack years: 2.50     Last attempt to quit: 2018     Years since quittin.8    Smokeless tobacco: Never Used   Substance and Sexual Activity    Alcohol use: No     Comment: holidays    Drug use: Not Currently     Types: Marijuana     Comment: states she last smoked 2 days ago    Sexual activity: Yes     Partners: Male   Lifestyle    Physical activity     Days per week: None     Minutes per session: None    Stress: None   Relationships    Social connections     Talks on phone: None     Gets together: None     Attends Latter day service: None     Active member of club or organization: None     Attends meetings of clubs or organizations: None     Relationship status: None    Intimate partner violence     Fear of current or ex partner: None     Emotionally abused: None     Physically abused: None     Forced sexual activity: None   Other Topics Concern    None   Social History Narrative    None       SCREENINGS             PHYSICAL EXAM    (up to 7 for level 4, 8 ormore for level 5)     ED Triage Vitals [20 1332]   BP Temp Temp Source Pulse Resp SpO2 Height Weight   (!) 156/89 98.3 °F (36.8 °C) Oral 85 16 100 % 5' (1.524 m) 110 lb (49.9 kg)       Physical Exam  Constitutional:       General: She is not in acute distress. Appearance: Normal appearance. She is well-developed. She is not ill-appearing or toxic-appearing. Comments: Sitting in bed comfortably, speaking in full sentences, following verbal commands appropriately.  Not in acute distress     HENT:      Head: Normocephalic and

## 2020-05-12 NOTE — ED NOTES
Patient brought back from triage. Patient changed out and oriented to [facility]. Will continue to monitor patient.      Mariana Castillo, SONYA  05/12/20 8033

## 2020-05-13 NOTE — BH NOTE
Patient was evaluated in the [facility] last night. Writer followed up with patient this morning to offer an appointment today with Olga. Patient explained that the [facility] filled her rx last night and she has a 2 week supply at this time. She states that she is taking Zoloft and hydroxyzine. Appointment scheduled for May 21st at 11am with Dr. Olayinka Aguero. Patient made aware of scheduled appointment. Patient denies any thoughts of suicide or homicide. She denies feeling of wanting to hurt the baby.

## 2020-05-21 ENCOUNTER — HOSPITAL ENCOUNTER (OUTPATIENT)
Dept: PSYCHIATRY | Age: 25
Setting detail: THERAPIES SERIES
Discharge: HOME | End: 2020-05-21
Payer: COMMERCIAL

## 2020-05-21 PROCEDURE — 99214 OFFICE O/P EST MOD 30 MIN: CPT | Performed by: PSYCHIATRY & NEUROLOGY

## 2020-05-21 PROCEDURE — 90792 PSYCH DIAG EVAL W/MED SRVCS: CPT | Performed by: PSYCHIATRY & NEUROLOGY

## 2020-05-21 RX ORDER — ARIPIPRAZOLE 5 MG/1
5 TABLET ORAL DAILY
Qty: 30 TABLET | Refills: 0 | Status: SHIPPED | OUTPATIENT
Start: 2020-05-21

## 2020-05-21 RX ORDER — SERTRALINE HYDROCHLORIDE 100 MG/1
100 TABLET, FILM COATED ORAL DAILY
Qty: 30 TABLET | Refills: 0 | Status: SHIPPED | OUTPATIENT
Start: 2020-05-21 | End: 2020-06-19

## 2020-05-21 RX ORDER — HYDROXYZINE 50 MG/1
50 TABLET, FILM COATED ORAL EVERY 8 HOURS PRN
Qty: 60 TABLET | Refills: 0 | Status: SHIPPED | OUTPATIENT
Start: 2020-05-21 | End: 2020-06-19
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
## 2020-06-04 ENCOUNTER — HOSPITAL ENCOUNTER (OUTPATIENT)
Dept: PSYCHIATRY | Age: 25
Setting detail: THERAPIES SERIES
Discharge: HOME | End: 2020-06-04
Payer: COMMERCIAL

## 2020-06-04 VITALS
DIASTOLIC BLOOD PRESSURE: 66 MMHG | RESPIRATION RATE: 16 BRPM | HEART RATE: 71 BPM | TEMPERATURE: 98.2 F | SYSTOLIC BLOOD PRESSURE: 111 MMHG

## 2020-06-04 PROCEDURE — 99215 OFFICE O/P EST HI 40 MIN: CPT | Performed by: PSYCHIATRY & NEUROLOGY

## 2020-06-04 NOTE — PROGRESS NOTES
Anion Gap 05/12/2020 14  3 - 16 Final    Glucose 05/12/2020 92  70 - 99 mg/dL Final    BUN 05/12/2020 7  7 - 20 mg/dL Final    CREATININE 05/12/2020 0.6  0.6 - 1.1 mg/dL Final    GFR Non- 05/12/2020 >60  >60 Final    Comment: >60 mL/min/1.73m2 EGFR, calc. for ages 25 and older using the  MDRD formula (not corrected for weight), is valid for stable  renal function.  GFR  05/12/2020 >60  >60 Final    Comment: Chronic Kidney Disease: less than 60 ml/min/1.73 sq.m. Kidney Failure: less than 15 ml/min/1.73 sq.m. Results valid for patients 18 years and older.       Calcium 05/12/2020 9.4  8.3 - 10.6 mg/dL Final    Total Protein 05/12/2020 7.3  6.4 - 8.2 g/dL Final    Alb 05/12/2020 4.6  3.4 - 5.0 g/dL Final    Albumin/Globulin Ratio 05/12/2020 1.7  1.1 - 2.2 Final    Total Bilirubin 05/12/2020 0.3  0.0 - 1.0 mg/dL Final    Alkaline Phosphatase 05/12/2020 81  40 - 129 U/L Final    ALT 05/12/2020 9* 10 - 40 U/L Final    AST 05/12/2020 18  15 - 37 U/L Final    Globulin 05/12/2020 2.7  g/dL Final    WBC 05/12/2020 4.5  4.0 - 11.0 K/uL Final    RBC 05/12/2020 3.75* 4.00 - 5.20 M/uL Final    Hemoglobin 05/12/2020 10.2* 12.0 - 16.0 g/dL Final    Hematocrit 05/12/2020 31.3* 36.0 - 48.0 % Final    MCV 05/12/2020 83.4  80.0 - 100.0 fL Final    MCH 05/12/2020 27.1  26.0 - 34.0 pg Final    MCHC 05/12/2020 32.5  31.0 - 36.0 g/dL Final    RDW 05/12/2020 15.1  12.4 - 15.4 % Final    Platelets 17/25/5345 278  135 - 450 K/uL Final    MPV 05/12/2020 7.7  5.0 - 10.5 fL Final    Neutrophils % 05/12/2020 60.4  % Final    Lymphocytes % 05/12/2020 33.0  % Final    Monocytes % 05/12/2020 4.5  % Final    Eosinophils % 05/12/2020 1.1  % Final    Basophils % 05/12/2020 1.0  % Final    Neutrophils Absolute 05/12/2020 2.7  1.7 - 7.7 K/uL Final    Lymphocytes Absolute 05/12/2020 1.5  1.0 - 5.1 K/uL Final    Monocytes Absolute 05/12/2020 0.2  0.0 - 1.3 K/uL Final    Eosinophils Absolute 05/12/2020 0.0  0.0 - 0.6 K/uL Final    Basophils Absolute 05/12/2020 0.0  0.0 - 0.2 K/uL Final    Ethanol Lvl 05/12/2020 None Detected  mg/dL Final    Comment:    None Detected  Conversion factor:  100 mg/dl = .100 g/dl  For Medical Purposes Only      Amphetamine Screen, Urine 05/12/2020 Neg  Negative <1000ng/mL Final    Barbiturate Screen, Ur 05/12/2020 Neg  Negative <200 ng/mL Final    Benzodiazepine Screen, Urine 05/12/2020 Neg  Negative <200 ng/mL Final    Cannabinoid Scrn, Ur 05/12/2020 POSITIVE* Negative <50 ng/mL Final    Cocaine Metabolite Screen, Urine 05/12/2020 Neg  Negative <300 ng/mL Final    Opiate Scrn, Ur 05/12/2020 Neg  Negative <300 ng/mL Final    Comment: \"Therapeutic levels of pain medication, especially oxycontin and synthetic  opioids, may not be detected by this Methodology. Pain management screen  panel  Drug panel-PM-Hi Res Ur, Interp (PAIN) should be considered for drug  monitoring \".  PCP Screen, Urine 05/12/2020 Neg  Negative <25 ng/mL Final    Methadone Screen, Urine 05/12/2020 Neg  Negative <300 ng/mL Final    Propoxyphene Scrn, Ur 05/12/2020 Neg  Negative <300 ng/mL Final    Oxycodone Urine 05/12/2020 Neg  Negative <100 ng/ml Final    pH, UA 05/12/2020 5.0   Final    Comment: Urine pH less than 5.0 or greater than 8.0 may indicate sample adulteration. Another sample should be collected if clinically  indicated.  Drug Screen Comment: 05/12/2020 see below   Final    Comment: This method is a screening test to detect only these drug  classes as part of a medical workup. Confirmatory testing  by another method should be ordered if clinically indicated.       HCG(Urine) Pregnancy Test 05/12/2020 Negative  Detects HCG level >20 MIU/mL Final    Comment: Note:  Always repeat results in question with a serum  quantitative pregnancy test. A serum hCG is positive  2-5 days before the urine hCG test.      Salicylate, Serum 78/87/0371 <0.3* 15.0 - 30.0 mg/dL Final    Comment: Therapeutic Range: 15.0-30.0 mg/dL  Toxic: >30.0 mg/dL      Acetaminophen Level 05/12/2020 <5* 10 - 30 ug/mL Final    Comment: Therapeutic Range: 10.0-30.0 ug/mL  Toxic: >=150 ug/mL              Medications  No current facility-administered medications for this encounter. ASSESSMENT AND PLAN    Principal Problem: Moderate episode of recurrent major depressive disorder (HCC)  Active Problems:    Drug abuse, marijuana    PTSD (post-traumatic stress disorder)  Resolved Problems:    * No resolved hospital problems. *       1. Patient s symptoms   are improving. No med changes  2. Probable discharge is uncertain. 3.Discharge planning is complete. Will refer to IOP  4. Suicidal ideation is none  5. Total time with patient was 40 minutes and more than 50 % of that time was spent counseling the patient on their symptoms, treatment and expected goals.

## 2020-06-18 ENCOUNTER — HOSPITAL ENCOUNTER (OUTPATIENT)
Dept: PSYCHIATRY | Age: 25
Setting detail: THERAPIES SERIES
Discharge: HOME | End: 2020-06-18
Payer: COMMERCIAL

## 2020-06-18 PROCEDURE — 99215 OFFICE O/P EST HI 40 MIN: CPT | Performed by: PSYCHIATRY & NEUROLOGY

## 2020-06-18 RX ORDER — HYDROXYZINE 50 MG/1
50 TABLET, FILM COATED ORAL EVERY 6 HOURS PRN
Qty: 60 TABLET | Refills: 0 | Status: SHIPPED | OUTPATIENT
Start: 2020-06-18 | End: 2020-07-18

## 2020-06-18 RX ORDER — SERTRALINE HYDROCHLORIDE 100 MG/1
100 TABLET, FILM COATED ORAL DAILY
Qty: 30 TABLET | Refills: 0 | Status: SHIPPED | OUTPATIENT
Start: 2020-06-18

## 2020-06-18 RX ORDER — ARIPIPRAZOLE 5 MG/1
5 TABLET ORAL DAILY
Qty: 30 TABLET | Refills: 0 | Status: SHIPPED | OUTPATIENT
Start: 2020-06-18 | End: 2020-06-19

## 2020-06-18 NOTE — BH NOTE
Patient in to see Dr. Lianet Cedillo. Dr. Lianet Cedillo is requesting patient be placed in the IOP program.  Patient given a notification card with her appointment on it for 6/19/20 @ 10am.  She has been notified that one missed day will eliminate her from the program.  Patient has missed multiple intake appointments. Patient verbalizes understanding and Dr. Lianet Cedillo agrees. Patient to start in Person IOP on 6/30/20.

## 2020-06-19 ENCOUNTER — HOSPITAL ENCOUNTER (OUTPATIENT)
Dept: PSYCHIATRY | Age: 25
Setting detail: THERAPIES SERIES
Discharge: HOME | End: 2020-06-19
Payer: COMMERCIAL

## 2020-06-19 ASSESSMENT — ANXIETY QUESTIONNAIRES
GAD7 TOTAL SCORE: 18
5. BEING SO RESTLESS THAT IT IS HARD TO SIT STILL: 2-OVER HALF THE DAYS
6. BECOMING EASILY ANNOYED OR IRRITABLE: 1-SEVERAL DAYS
1. FEELING NERVOUS, ANXIOUS, OR ON EDGE: 3-NEARLY EVERY DAY
3. WORRYING TOO MUCH ABOUT DIFFERENT THINGS: 3-NEARLY EVERY DAY
2. NOT BEING ABLE TO STOP OR CONTROL WORRYING: 3-NEARLY EVERY DAY
7. FEELING AFRAID AS IF SOMETHING AWFUL MIGHT HAPPEN: 3-NEARLY EVERY DAY
4. TROUBLE RELAXING: 3-NEARLY EVERY DAY

## 2020-06-19 ASSESSMENT — SLEEP AND FATIGUE QUESTIONNAIRES
AVERAGE NUMBER OF SLEEP HOURS: 13
RESTFUL SLEEP: NO
DIFFICULTY FALLING ASLEEP: YES
DO YOU USE A SLEEP AID: NO
DO YOU HAVE DIFFICULTY SLEEPING: YES
DIFFICULTY STAYING ASLEEP: YES
SLEEP PATTERN: DIFFICULTY FALLING ASLEEP;RESTLESSNESS;NIGHTMARES/TERRORS
DIFFICULTY ARISING: YES

## 2020-06-19 ASSESSMENT — PATIENT HEALTH QUESTIONNAIRE - PHQ9: SUM OF ALL RESPONSES TO PHQ QUESTIONS 1-9: 21

## 2020-06-19 ASSESSMENT — LIFESTYLE VARIABLES: HISTORY_ALCOHOL_USE: NO
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
## 2020-06-29 ENCOUNTER — HOSPITAL ENCOUNTER (OUTPATIENT)
Dept: PSYCHIATRY | Age: 25
Setting detail: THERAPIES SERIES
Discharge: HOME | End: 2020-06-29
Payer: COMMERCIAL

## 2020-06-29 VITALS — TEMPERATURE: 97.9 F

## 2020-06-29 PROCEDURE — 90853 GROUP PSYCHOTHERAPY: CPT

## 2020-06-29 PROCEDURE — 90853 GROUP PSYCHOTHERAPY: CPT | Performed by: COUNSELOR

## 2020-06-29 NOTE — GROUP NOTE
Group Therapy Note    Date: 6/29/2020    Group Start Time: 10:00 AM  Group End Time: 11:00 AM  Group Topic: Recovery    [facility]    Amber Martinez, [facility]        Group Therapy Note    Attendees: 4         Patient's Goal:  Patient will complete worksheet on boundaries and will discuss how they apply to mental wellbeing. Notes:  Patient attended group. Completed the worksheet and discussed in group. Verbalized a basic understanding of boundaries and talked about problems she has had in the past with poor boundaries. Status After Intervention:  Improved    Participation Level:  Active Listener and Interactive    Participation Quality: Appropriate and Attentive    Speech:  normal    Thought Process/Content: Logical    Affective Functioning: Congruent    Mood: anxious, depressed     Level of consciousness:  Oriented x4    Response to Learning: Able to verbalize current knowledge/experience and Able to verbalize/acknowledge new learning    Endings: None Reported    Modes of Intervention: Education, Support, Socialization and Exploration    Discipline Responsible: /Counselor    Signature:  Amber Martinez, [facility]

## 2020-06-30 ENCOUNTER — APPOINTMENT (OUTPATIENT)
Dept: PSYCHIATRY | Age: 25
End: 2020-06-30
Payer: COMMERCIAL

## 2020-06-30 ENCOUNTER — HOSPITAL ENCOUNTER (EMERGENCY)
Age: 25
Discharge: HOME | End: 2020-06-30
Attending: EMERGENCY MEDICINE
Payer: COMMERCIAL

## 2020-06-30 ENCOUNTER — HOSPITAL ENCOUNTER (OUTPATIENT)
Dept: PSYCHIATRY | Age: 25
Setting detail: THERAPIES SERIES
End: 2020-06-30
Payer: COMMERCIAL

## 2020-06-30 VITALS
HEART RATE: 109 BPM | HEIGHT: 60 IN | SYSTOLIC BLOOD PRESSURE: 139 MMHG | TEMPERATURE: 98.4 F | RESPIRATION RATE: 14 BRPM | OXYGEN SATURATION: 100 % | WEIGHT: 115 LBS | DIASTOLIC BLOOD PRESSURE: 89 MMHG | BODY MASS INDEX: 22.58 KG/M2

## 2020-06-30 LAB
A/G RATIO: 2.1 (ref 1.1–2.2)
ACETAMINOPHEN LEVEL: <5 UG/ML (ref 10–30)
ALBUMIN SERPL-MCNC: 5.2 G/DL (ref 3.4–5)
ALP BLD-CCNC: 61 U/L (ref 40–129)
ALT SERPL-CCNC: 11 U/L (ref 10–40)
ANION GAP SERPL CALCULATED.3IONS-SCNC: 14 MMOL/L (ref 3–16)
AST SERPL-CCNC: 20 U/L (ref 15–37)
BILIRUB SERPL-MCNC: 0.5 MG/DL (ref 0–1)
BUN BLDV-MCNC: 12 MG/DL (ref 7–20)
CALCIUM SERPL-MCNC: 9.8 MG/DL (ref 8.3–10.6)
CHLORIDE BLD-SCNC: 103 MMOL/L (ref 99–110)
CO2: 22 MMOL/L (ref 21–32)
CREAT SERPL-MCNC: 0.7 MG/DL (ref 0.6–1.1)
ETHANOL: NORMAL MG/DL (ref 0–0.08)
GFR AFRICAN AMERICAN: >60
GFR NON-AFRICAN AMERICAN: >60
GLOBULIN: 2.5 G/DL
GLUCOSE BLD-MCNC: 99 MG/DL (ref 70–99)
HCG QUALITATIVE: NEGATIVE
HCT VFR BLD CALC: 34.7 % (ref 36–48)
HEMOGLOBIN: 11.3 G/DL (ref 12–16)
MCH RBC QN AUTO: 26.3 PG (ref 26–34)
MCHC RBC AUTO-ENTMCNC: 32.6 G/DL (ref 31–36)
MCV RBC AUTO: 80.8 FL (ref 80–100)
PDW BLD-RTO: 17 % (ref 12.4–15.4)
PLATELET # BLD: 290 K/UL (ref 135–450)
PMV BLD AUTO: 8 FL (ref 5–10.5)
POTASSIUM REFLEX MAGNESIUM: 3.9 MMOL/L (ref 3.5–5.1)
RBC # BLD: 4.3 M/UL (ref 4–5.2)
SALICYLATE, SERUM: <0.3 MG/DL (ref 15–30)
SODIUM BLD-SCNC: 139 MMOL/L (ref 136–145)
TOTAL PROTEIN: 7.7 G/DL (ref 6.4–8.2)
WBC # BLD: 6.7 K/UL (ref 4–11)

## 2020-06-30 PROCEDURE — 90792 PSYCH DIAG EVAL W/MED SRVCS: CPT | Performed by: NURSE PRACTITIONER

## 2020-06-30 PROCEDURE — 84703 CHORIONIC GONADOTROPIN ASSAY: CPT

## 2020-06-30 PROCEDURE — 85027 COMPLETE CBC AUTOMATED: CPT

## 2020-06-30 PROCEDURE — 80053 COMPREHEN METABOLIC PANEL: CPT

## 2020-06-30 PROCEDURE — G0480 DRUG TEST DEF 1-7 CLASSES: HCPCS

## 2020-06-30 PROCEDURE — 99284 EMERGENCY DEPT VISIT MOD MDM: CPT

## 2020-06-30 ASSESSMENT — PAIN SCALES - GENERAL: PAINLEVEL_OUTOF10: 0

## 2020-06-30 NOTE — BH NOTE
Patient arrived around [phone] asking to sign a release so that her mother could hear that she is \"doing what I should. \" Patient signed LISBETH and then further explained that her mother planned to meet her in the ED and thought that she needed evaluated. Asked patient if she felt that she needed to be evaluated, patient stated that she thought maybe she should. Patient denying SI/HI at this time. States that her anxiety has been higher but otherwise feels she is doing well. Walked patient down to the ED per patient request.    Patient called her mother, Soco Donovan, while she was being registered and her mother could be heard yelling over the phone before she ended the call. A few minutes later, patient's mother came to the ED and offered to watch patient's children. Patient declined but wanted her mother to wait with her, which she did. Patient's mother was calm and polite during interaction with writer. Patient stated that if she is not admitted, she does want to still attend IOP tomorrow as scheduled.
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
## 2020-06-30 NOTE — BH NOTE
Met with patient in room 8 of the ED. She reports that she got into an argument with her mother earlier today and her mother made her come into the ED in an attempt to get her admitted to inpatient psychiatry, \"she always tells me I need to go to correction or to inpatient\". She endorses feeling sad recently but denies current SI/HI, AVH. She does report a history of cutting, most recent \"a few weeks ago\". She does have superficial scratches on bilateral arms but no open wounds noted. She reports that she cuts as an emotional release, not in a suicide attempt. She has experienced recent stressors, including dealing with her mother and recently finding out her boyfriend cheated on her. She is established in [facility] IOP/PHP program, notes that she has openly discussed her problems with her outpatient therapist/provider, and is scheduled to come in for program tomorrow. Patient is not holdable at this time and does not feel that she needs inpatient psychiatry. States \"I feel outpatient is helpful. I came in here to get my mom off my back. I need someone to talk to her though because as soon as you tell her I'm being discharged, she'll get upset and try to take me to [facility] to get me admitted there\". She is cleared psychiatrically at this time. Recommend following up with scheduled IOP/PHP services tomorrow. Full psychiatric consult note to follow.

## 2020-06-30 NOTE — ED NOTES
When writer left the room, the mother was waiting outside and continued to rant about how we had failed her and she needed inpatient and those babies aren't safe.       Elham Camacho RN  06/30/20 [id number]

## 2020-07-01 ENCOUNTER — HOSPITAL ENCOUNTER (OUTPATIENT)
Dept: PSYCHIATRY | Age: 25
Setting detail: THERAPIES SERIES
Discharge: HOME | End: 2020-07-01
Payer: COMMERCIAL

## 2020-07-01 VITALS — TEMPERATURE: 97.7 F

## 2020-07-01 PROCEDURE — 90853 GROUP PSYCHOTHERAPY: CPT

## 2020-07-01 PROCEDURE — 90853 GROUP PSYCHOTHERAPY: CPT | Performed by: COUNSELOR

## 2020-07-01 NOTE — GROUP NOTE
Group Therapy Note    Date: 7/1/2020    Group Start Time: 11:15 AM  Group End Time: 12:15 PM  Group Topic: Cognitive Skills    JAKE Alcazar MAAT        Group Therapy Note    Attendees: 4         Patient's Goal: Patients were invited to engaged in an Art Therapy / Cognitive Skills group. Patients were invited to continue their work from the 10 am group session of learning the benefits of mandalas and then making and coloring a manadala while listening to relaxing music. At the end of group, patients wee encouraged to share and process their work with the group. Notes:  Charlie Mccarthy appeared to actively engage in [address], participated in group conversation with peers, and shared her mandala at the end of session. Status After Intervention:  Improved    Participation Level:  Active Listener and Interactive    Participation Quality: Appropriate, Attentive, Sharing and Supportive      Speech:  pressured      Thought Process/Content: Logical      Affective Functioning: Constricted/Restricted      Mood: anxious      Level of consciousness:  Alert, Oriented x4 and Attentive      Response to Learning: Able to verbalize current knowledge/experience, Able to verbalize/acknowledge new learning, Able to retain information and Capable of insight      Endings: None Reported    Modes of Intervention: Education, Support, Socialization, Exploration, Activity and Media      Discipline Responsible: Psychoeducational Specialist      Signature:  Carla Rodriguez, [address] [facility]

## 2020-07-01 NOTE — GROUP NOTE
Group Therapy Note    Date: 7/1/2020    Group Start Time: 10:05 AM  Group End Time: 11:00 AM  Group Topic: Cognitive Skills    [facility]    Beth Youssef, [facility]        Group Therapy Note    Attendees: 4       Patient's Goal:  Pt's goal was to learn the benefits of mandalas and then make and color a manadala while listening to relaxing music.      Notes:  Pt did participate in group activity and discussion. Pt met goal.    Status After Intervention:  Improved    Participation Level:  Active Listener and Interactive    Participation Quality: Appropriate, Attentive and Sharing      Speech:  normal      Thought Process/Content: Logical  Linear      Affective Functioning: Congruent      Mood: euthymic      Level of consciousness:  Alert, Oriented x4 and Attentive      Response to Learning: Able to verbalize current knowledge/experience and Progressing to goal      Endings: None Reported    Modes of Intervention: Education, Support, Socialization, Exploration, Clarifying, Problem-solving, Activity, Movement, Confrontation, Limit-setting and Reality-testing      Discipline Responsible: /Counselor      Signature:  Zi Cole 55
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
## 2020-07-02 ENCOUNTER — APPOINTMENT (OUTPATIENT)
Dept: PSYCHIATRY | Age: 25
End: 2020-07-02
Payer: COMMERCIAL

## 2020-07-07 ENCOUNTER — APPOINTMENT (OUTPATIENT)
Dept: PSYCHIATRY | Age: 25
End: 2020-07-07
Payer: COMMERCIAL

## 2020-07-08 ENCOUNTER — HOSPITAL ENCOUNTER (OUTPATIENT)
Dept: PSYCHIATRY | Age: 25
Setting detail: THERAPIES SERIES
Discharge: HOME | End: 2020-07-08
Payer: COMMERCIAL

## 2020-07-08 NOTE — BH NOTE
Patient did not arrive for group. Patient's mother called writer to state that Jackie Donaldson is still in [facility]. Writer confirmed this by checking on the [facility].   Will discuss patient's treatment today during treatment team.

## 2020-07-09 ENCOUNTER — APPOINTMENT (OUTPATIENT)
Dept: PSYCHIATRY | Age: 25
End: 2020-07-09
Payer: COMMERCIAL

## 2020-07-13 ENCOUNTER — APPOINTMENT (OUTPATIENT)
Dept: PSYCHIATRY | Age: 25
End: 2020-07-13
Payer: COMMERCIAL

## 2020-07-14 ENCOUNTER — APPOINTMENT (OUTPATIENT)
Dept: PSYCHIATRY | Age: 25
End: 2020-07-14
Payer: COMMERCIAL

## 2020-07-15 ENCOUNTER — APPOINTMENT (OUTPATIENT)
Dept: PSYCHIATRY | Age: 25
End: 2020-07-15
Payer: COMMERCIAL

## 2020-07-16 ENCOUNTER — APPOINTMENT (OUTPATIENT)
Dept: PSYCHIATRY | Age: 25
End: 2020-07-16
Payer: COMMERCIAL

## 2020-07-20 ENCOUNTER — APPOINTMENT (OUTPATIENT)
Dept: PSYCHIATRY | Age: 25
End: 2020-07-20
Payer: COMMERCIAL

## 2020-07-21 ENCOUNTER — APPOINTMENT (OUTPATIENT)
Dept: PSYCHIATRY | Age: 25
End: 2020-07-21
Payer: COMMERCIAL

## 2020-07-22 ENCOUNTER — APPOINTMENT (OUTPATIENT)
Dept: PSYCHIATRY | Age: 25
End: 2020-07-22
Payer: COMMERCIAL

## 2020-07-23 ENCOUNTER — APPOINTMENT (OUTPATIENT)
Dept: PSYCHIATRY | Age: 25
End: 2020-07-23
Payer: COMMERCIAL

## 2020-07-27 ENCOUNTER — HOSPITAL ENCOUNTER (OUTPATIENT)
Dept: PSYCHIATRY | Age: 25
Setting detail: THERAPIES SERIES
Discharge: HOME | End: 2020-07-27
Payer: COMMERCIAL

## 2020-07-28 ENCOUNTER — APPOINTMENT (OUTPATIENT)
Dept: PSYCHIATRY | Age: 25
End: 2020-07-28
Payer: COMMERCIAL

## 2020-07-29 ENCOUNTER — APPOINTMENT (OUTPATIENT)
Dept: PSYCHIATRY | Age: 25
End: 2020-07-29
Payer: COMMERCIAL

## 2020-07-30 ENCOUNTER — APPOINTMENT (OUTPATIENT)
Dept: PSYCHIATRY | Age: 25
End: 2020-07-30
Payer: COMMERCIAL

## 2020-11-24 ENCOUNTER — HOSPITAL ENCOUNTER (EMERGENCY)
Age: 25
Discharge: HOME | End: 2020-11-24
Attending: EMERGENCY MEDICINE
Payer: COMMERCIAL

## 2020-11-24 VITALS
TEMPERATURE: 97.5 F | SYSTOLIC BLOOD PRESSURE: 110 MMHG | WEIGHT: 100 LBS | OXYGEN SATURATION: 99 % | RESPIRATION RATE: 16 BRPM | DIASTOLIC BLOOD PRESSURE: 76 MMHG | HEART RATE: 99 BPM | HEIGHT: 60 IN | BODY MASS INDEX: 19.63 KG/M2

## 2020-11-24 PROCEDURE — 99283 EMERGENCY DEPT VISIT LOW MDM: CPT

## 2020-11-24 RX ORDER — BACITRACIN ZINC AND POLYMYXIN B SULFATE 500; 1000 [USP'U]/G; [USP'U]/G
OINTMENT TOPICAL
Qty: 1 TUBE | Refills: 0 | Status: SHIPPED | OUTPATIENT
Start: 2020-11-24 | End: 2020-12-01

## 2020-11-24 ASSESSMENT — PAIN DESCRIPTION - PAIN TYPE: TYPE: ACUTE PAIN

## 2020-11-24 ASSESSMENT — PAIN SCALES - GENERAL: PAINLEVEL_OUTOF10: 6

## 2020-11-24 ASSESSMENT — PAIN DESCRIPTION - ORIENTATION: ORIENTATION: MID

## 2020-11-24 ASSESSMENT — PAIN DESCRIPTION - DESCRIPTORS: DESCRIPTORS: BURNING

## 2020-11-24 ASSESSMENT — PAIN DESCRIPTION - LOCATION: LOCATION: OTHER (COMMENT)

## 2020-11-24 NOTE — ED NOTES
RN chaperoned MD for US of coccyx area. No pocket of fluid noted. Pt does have breakdown, reddened area of skin noted. No purulent drainage noted at this time.       Siddhartha Dorado RN  11/24/20 [id number]

## 2020-11-24 NOTE — ED PROVIDER NOTES
[address] ED      CHIEF COMPLAINT  Abscess (tail bone)       HISTORY OF PRESENT ILLNESS  Ramu Millard is a 22 y.o. female  who presents to the ED for evaluation of rash present on her tailbone. Patient reports she noticed irritation of her buttocks around her tailbone and thinks it may be a chemical burn. Patient says she was eating a beef she sat on bleach on the toilet seat and that is why she is got irritation by her tailbone. Denies any known injuries. Denies having history of pilonidal abscess. Denies having fevers or chills. Denies having any other complaints. No other complaints, modifying factors or associated symptoms. I have reviewed the following from the nursing documentation.     Past Medical History:   Diagnosis Date    ADD (attention deficit disorder)     Anxiety     Bipolar affective ([facility] [address]     Depression     Drug abuse, marijuana 2015    Hormone disorder     PTSD (post-traumatic stress disorder)     Urinary tract infection     Yeast infection      Past Surgical History:   Procedure Laterality Date    ESOPHAGUS SURGERY  2016    INDUCED   2014    WISDOM TOOTH EXTRACTION  2018     Family History   Problem Relation Age of Onset    High Blood Pressure Mother     High Cholesterol Mother     Mental Illness Mother     High Blood Pressure Father     High Cholesterol Father     Mental Illness Father     OCD Sister     High Blood Pressure Maternal Grandmother     Scoliosis Maternal Grandmother     Heart Disease Maternal Grandfather     Clotting Disorder Maternal Grandfather     Mental Illness Maternal Grandfather     Cancer Paternal Grandfather      Social History     Socioeconomic History    Marital status: Single     Spouse name: Not on file    Number of children: Not on file    Years of education: Not on file    Highest education level: Not on file   Occupational History    Not on file   Social Needs    Financial resource strain: Not on file    Food insecurity     Worry: Not on file     Inability: Not on file    Transportation needs     Medical: Not on file     Non-medical: Not on file   Tobacco Use    Smoking status: Current Every Day Smoker     Packs/day: 0.25     Years: 10.00     Pack years: 2.50     Last attempt to quit: 2018     Years since quittin.4    Smokeless tobacco: Never Used   Substance and Sexual Activity    Alcohol use: No     Comment: holidays    Drug use: Not Currently     Types: Marijuana     Comment: states she last smoked 2 days ago    Sexual activity: Yes     Partners: Male   Lifestyle    Physical activity     Days per week: Not on file     Minutes per session: Not on file    Stress: Not on file   Relationships    Social connections     Talks on phone: Not on file     Gets together: Not on file     Attends [facility] service: Not on file     Active member of club or organization: Not on file     Attends meetings of clubs or organizations: Not on file     Relationship status: Not on file    Intimate partner violence     Fear of current or ex partner: Not on file     Emotionally abused: Not on file     Physically abused: Not on file     Forced sexual activity: Not on file   Other Topics Concern    Not on file   Social History Narrative    Not on file     No current facility-administered medications for this encounter. Current Outpatient Medications   Medication Sig Dispense Refill    bacitracin-polymyxin b (POLYSPORIN) [id number] UNIT/GM ointment Apply topically 2 times daily.  1 Tube 0    sertraline (ZOLOFT) 100 MG tablet Take 1 tablet by mouth daily 30 tablet 0    ARIPiprazole (ABILIFY) 5 MG tablet Take 1 tablet by mouth daily 30 tablet 0    ferrous sulfate 27 MG TABS Take by mouth daily       Allergies   Allergen Reactions    Latex Itching and Swelling    Monistat [Miconazole] Dermatitis       REVIEW OF SYSTEMS  10 systems reviewed, pertinent positives per HPI otherwise noted to be negative. PHYSICAL EXAM  /74   Pulse 102   Temp 97.5 °F (36.4 °C) (Oral)   Resp 18   Ht 5' (1.524 m)   Wt 100 lb (45.4 kg)   LMP 11/01/2020   SpO2 99%   BMI 19.53 kg/m²    GENERAL APPEARANCE: Awake and alert. Cooperative. No acute distress. HENT: Normocephalic. Atraumatic. CN  2-12 grossly intact. HEART/CHEST: RRR. No murmurs appreciated  LUNGS: Respirations unlabored. Speaking comfortably in full sentences. CTAB. ABDOMEN: Soft, non-distended abdomen. Non tender to palpation. No guarding. No rebound. EXTREMITIES: No gross deformities. Moving all extremities. All extremities neurovascularly intact. SKIN: Warm and dry. Excoriated lesion on gluteal folds. NEUROLOGICAL: Alert and oriented. CN's 2-12 intact. No gross facial drooping. Strength 5/5, sensation intact. PSYCHIATRIC: Normal mood and affect. LABS  No results found for this visit on 11/24/20. RADIOLOGY  No results found. ED COURSE/MDM  Patient seen and evaluated. At presentation, patient was awake, alert, afebrile, hemodynamically stable, and satting well on room air. Exam markable for excoriation present on the gluteal fold. Bedside ultrasound showed no drainable fluid collection. Patient educated about pilonidal cyst.  Discussed ultrasound does not show abscess/cyst but if she were to develop fluid collection, or pimple-like lesion, that patient needs to follow-up with her PCP or return to the ED. Patient verbalized understanding. Patient given prescription of bacitracin ointment. She was discharged home with strict return precautions. During the patient's ED course, the patient was given:  Medications - No data to display     CLINICAL IMPRESSION  1. Excoriation        Blood pressure 115/74, pulse 102, temperature 97.5 °F (36.4 °C), temperature source Oral, resp. rate 18, height 5' (1.524 m), weight 100 lb (45.4 kg), last menstrual period 11/01/2020, SpO2 99 %, not currently breastfeeding.     DISPOSITION  Spero Asp Da Isak Culver was discharged home in stable condition. Patient was given scripts for the following medications. I counseled patient how to take these medications. New Prescriptions    BACITRACIN-POLYMYXIN B (POLYSPORIN) [id number] UNIT/GM OINTMENT    Apply topically 2 times daily. Follow-up with:  KAITY Foreman - CNP  [address] [address]    In 2 days        DISCLAIMER: This chart was created using Dragon dictation software. Efforts were made by me to ensure accuracy, however some errors may be present due to limitations of this technology and occasionally words are not transcribed correctly.        Vimal Masters MD  11/24/20 7989

## 2020-11-24 NOTE — ED NOTES
Discharge paperwork given to and reviewed with pt. Pt verbalized understanding and all questions answered. Pt encouraged to return if having worsening symptoms or new symptoms discussed in discharge paperwork. Pt to follow up with PCP  Rx x 1 given and medications reviewed with pt. Pt in NAD, RR even and unlabored.  Pt off unit ambulatory      Siddhartha Dorado, [facility]  11/24/20 9021

## 2021-03-02 ENCOUNTER — HOSPITAL ENCOUNTER (EMERGENCY)
Age: 26
Discharge: HOME | End: 2021-03-02
Payer: COMMERCIAL

## 2021-03-02 ENCOUNTER — APPOINTMENT (OUTPATIENT)
Dept: GENERAL RADIOLOGY | Age: 26
End: 2021-03-02
Payer: COMMERCIAL

## 2021-03-02 VITALS
SYSTOLIC BLOOD PRESSURE: 121 MMHG | TEMPERATURE: 98.4 F | DIASTOLIC BLOOD PRESSURE: 81 MMHG | OXYGEN SATURATION: 100 % | BODY MASS INDEX: 19.63 KG/M2 | RESPIRATION RATE: 16 BRPM | WEIGHT: 100 LBS | HEIGHT: 60 IN | HEART RATE: 90 BPM

## 2021-03-02 DIAGNOSIS — Z20.822 PERSON UNDER INVESTIGATION FOR COVID-19: ICD-10-CM

## 2021-03-02 DIAGNOSIS — J06.9 UPPER RESPIRATORY TRACT INFECTION, UNSPECIFIED TYPE: Primary | ICD-10-CM

## 2021-03-02 LAB — HCG(URINE) PREGNANCY TEST: NEGATIVE

## 2021-03-02 PROCEDURE — U0003 INFECTIOUS AGENT DETECTION BY NUCLEIC ACID (DNA OR RNA); SEVERE ACUTE RESPIRATORY SYNDROME CORONAVIRUS 2 (SARS-COV-2) (CORONAVIRUS DISEASE [COVID-19]), AMPLIFIED PROBE TECHNIQUE, MAKING USE OF HIGH THROUGHPUT TECHNOLOGIES AS DESCRIBED BY CMS-2020-01-R: HCPCS

## 2021-03-02 PROCEDURE — 6370000000 HC RX 637 (ALT 250 FOR IP): Performed by: PHYSICIAN ASSISTANT

## 2021-03-02 PROCEDURE — 71045 X-RAY EXAM CHEST 1 VIEW: CPT

## 2021-03-02 PROCEDURE — 99284 EMERGENCY DEPT VISIT MOD MDM: CPT

## 2021-03-02 PROCEDURE — 84703 CHORIONIC GONADOTROPIN ASSAY: CPT

## 2021-03-02 PROCEDURE — 36415 COLL VENOUS BLD VENIPUNCTURE: CPT

## 2021-03-02 RX ORDER — ASPIRIN 81 MG/1
81 TABLET ORAL DAILY
Qty: 30 TABLET | Refills: 0 | Status: SHIPPED | OUTPATIENT
Start: 2021-03-02

## 2021-03-02 RX ORDER — ALBUTEROL SULFATE 90 UG/1
2 AEROSOL, METERED RESPIRATORY (INHALATION) ONCE
Status: COMPLETED | OUTPATIENT
Start: 2021-03-02 | End: 2021-03-02

## 2021-03-02 RX ORDER — ONDANSETRON 4 MG/1
4-8 TABLET, ORALLY DISINTEGRATING ORAL EVERY 8 HOURS PRN
Qty: 12 TABLET | Refills: 0 | Status: SHIPPED | OUTPATIENT
Start: 2021-03-02

## 2021-03-02 RX ADMIN — ALBUTEROL SULFATE 2 PUFF: 90 AEROSOL, METERED RESPIRATORY (INHALATION) at 10:47

## 2021-03-02 ASSESSMENT — ENCOUNTER SYMPTOMS
SHORTNESS OF BREATH: 1
EYE PAIN: 0
VOMITING: 1
SORE THROAT: 0
NAUSEA: 1
BACK PAIN: 0
COUGH: 1
ABDOMINAL PAIN: 0

## 2021-03-02 ASSESSMENT — PAIN DESCRIPTION - LOCATION: LOCATION: RIB CAGE

## 2021-03-02 NOTE — ED PROVIDER NOTES
[address] ED  EMERGENCY DEPARTMENT ENCOUNTER        Pt Name: Erica Card  MRN: [id number]  [place] 1995  Date of evaluation: 3/2/2021  Provider: MONIKA Lombardi  PCP: KAITY Barillas CNP    CARROL. I have evaluated this patient. My supervising physician was available for consultation. CHIEF COMPLAINT       Chief Complaint   Patient presents with    Concern For [id number]     patient states she has been sick x4 days. SOB, cough, fever, nausea and vomiting. HISTORY OF PRESENT ILLNESS   (Location, Timing/Onset, Context/Setting, Quality, Duration, Modifying Factors, Severity, Associated Signs and Symptoms)  Note limiting factors. Erica Card is a 26 y.o. female presents to the emergency department for concern for COVID-19. Patient has had a nonproductive cough, shortness of breath, subjective fevers, vomiting over the last 5 days. Denies alleviating or aggravating factors. Denies headache, neck pain or stiffness, abdominal pain, chest pain, wheezing, hemoptysis, calf swelling or pain, recent surgery or immobilization, prior PE or DVT, malignancy, hormone use. No known COVID-19 contacts, though children are attending in person school. Requesting albuterol treatment, has used it before for bronchitis, no history of asthma    Nursing Notes were all reviewed and agreed with or any disagreements were addressed in the HPI. REVIEW OF SYSTEMS    (2-9 systems for level 4, 10 or more for level 5)     Review of Systems   Constitutional: Positive for fever. HENT: Negative for sore throat. Eyes: Negative for pain and visual disturbance. Respiratory: Positive for cough and shortness of breath. Cardiovascular: Negative for chest pain. Gastrointestinal: Positive for nausea and vomiting. Negative for abdominal pain. Genitourinary: Negative for dysuria and frequency. Musculoskeletal: Positive for neck pain. Negative for back pain.    Skin: Negative for rash. Neurological: Negative for dizziness, weakness, numbness and headaches. Psychiatric/Behavioral: Negative for confusion. Positives and Pertinent negatives as per HPI. Except as noted above in the ROS, all other systems were reviewed and negative.        PAST MEDICAL HISTORY     Past Medical History:   Diagnosis Date    ADD (attention deficit disorder)     Anxiety     Bipolar affective ([facility] [address]     Depression     Drug abuse, marijuana 2015    Hormone disorder     PTSD (post-traumatic stress disorder)     Urinary tract infection     Yeast infection          SURGICAL HISTORY     Past Surgical History:   Procedure Laterality Date    ESOPHAGUS SURGERY  2016    INDUCED   2014    WISDOM TOOTH EXTRACTION           [address]       Discharge Medication List as of 3/2/2021 10:50 AM      CONTINUE these medications which have NOT CHANGED    Details   sertraline (ZOLOFT) 100 MG tablet Take 1 tablet by mouth daily, Disp-30 tablet, R-0Normal      ARIPiprazole (ABILIFY) 5 MG tablet Take 1 tablet by mouth daily, Disp-30 tablet, R-0Normal      ferrous sulfate 27 MG TABS Take by mouth dailyHistorical Med               ALLERGIES     Latex and Monistat [miconazole]    FAMILYHISTORY       Family History   Problem Relation Age of Onset    High Blood Pressure Mother     High Cholesterol Mother     Mental Illness Mother     High Blood Pressure Father     High Cholesterol Father     Mental Illness Father     OCD Sister     High Blood Pressure Maternal Grandmother     Scoliosis Maternal Grandmother     Heart Disease Maternal Grandfather     Clotting Disorder Maternal Grandfather     Mental Illness Maternal Grandfather     Cancer Paternal Grandfather           SOCIAL HISTORY       Social History     Tobacco Use    Smoking status: Former Smoker     Years: 10.00     Quit date: 2018     Years since quittin.6    Smokeless tobacco: Never Used   Substance Use Topics    Alcohol use: Not Currently     Comment: holidays    Drug use: Yes     Types: Marijuana     Comment: states she last smoked 2 days ago       SCREENINGS    Grannis Coma Scale  Eye Opening: Spontaneous  Best Verbal Response: Oriented  Best Motor Response: Obeys commands  Grannis Coma Scale Score: 15        PHYSICAL EXAM    (up to 7 for level 4, 8 or more for level 5)     ED Triage Vitals   BP Temp Temp src Pulse Resp SpO2 Height Weight   03/02/21 0919 03/02/21 0919 -- 03/02/21 0919 03/02/21 0919 03/02/21 0919 03/02/21 0918 03/02/21 0918   118/86 98.4 °F (36.9 °C)  93 18 100 % 5' (1.524 m) 100 lb (45.4 kg)       Physical Exam  Vitals signs reviewed. Constitutional:       Appearance: She is not diaphoretic. HENT:      Nose: No congestion or rhinorrhea. Mouth/Throat:      Mouth: Mucous membranes are moist.      Pharynx: Oropharynx is clear. No oropharyngeal exudate or posterior oropharyngeal erythema. Eyes:      General: No scleral icterus. Conjunctiva/sclera: Conjunctivae normal.   Neck:      Musculoskeletal: Normal range of motion and neck supple. No neck rigidity or muscular tenderness. Cardiovascular:      Rate and Rhythm: Normal rate and regular rhythm. Pulses: Normal pulses. Heart sounds: Normal heart sounds. No murmur. No friction rub. No gallop. Pulmonary:      Effort: Pulmonary effort is normal. No respiratory distress. Breath sounds: Normal breath sounds. No stridor. No wheezing, rhonchi or rales. Abdominal:      General: There is no distension. Palpations: Abdomen is soft. Tenderness: There is no abdominal tenderness. There is no right CVA tenderness, left CVA tenderness, guarding or rebound. Musculoskeletal: Normal range of motion. General: No swelling or tenderness. Lymphadenopathy:      Cervical: No cervical adenopathy. Skin:     General: Skin is warm and dry. Neurological:      General: No focal deficit present.       Mental Status: She is alert and oriented to person, place, and time. Sensory: No sensory deficit. Motor: No weakness. Gait: Gait normal.   Psychiatric:         Mood and Affect: Mood normal.         Behavior: Behavior normal.         DIAGNOSTIC RESULTS   LABS:    Labs Reviewed   PREGNANCY, URINE    Narrative:     Performed at:  [facility]) [facility],  [place], [facility]   Phone [phone]       All other labs were within normal range or not returned as of this dictation. EKG: All EKG's are interpreted by the Emergency Department Physician in the absence of a cardiologist.  Please see their note for interpretation of EKG. RADIOLOGY:   Non-plain film images such as CT, Ultrasound and MRI are read by the radiologist. Plain radiographic images are visualized and preliminarily interpreted by the ED Provider with the below findings:        Interpretation per the Radiologist below, if available at the time of this note:    XR CHEST PORTABLE   Final Result   No acute process. No results found. PROCEDURES   Unless otherwise noted below, none     Procedures    CRITICAL CARE TIME   N/A    CONSULTS:  None      EMERGENCY DEPARTMENT COURSE and DIFFERENTIAL DIAGNOSIS/MDM:   Vitals:    Vitals:    03/02/21 0918 03/02/21 0919 03/02/21 1100   BP:  118/86 121/81   Pulse:  93 90   Resp:  18 16   Temp:  98.4 °F (36.9 °C)    SpO2:  100% 100%   Weight: 100 lb (45.4 kg)     Height: 5' (1.524 m)         Patient was given the following medications:  Medications   albuterol sulfate  (90 Base) MCG/ACT inhaler 2 puff (2 puffs Inhalation Given 3/2/21 1047)           80-year-old female presents emergency room with concern for COVID-19. Fevers, cough myalgias are a presentation very consistent with COVID-19. Chest x-ray without evidence of pneumonia. No hypoxia, tachypnea, tachycardia throughout her stay in the emergency room.   Abdominal exam nonperitoneal, low concern for intra-abdominal emergency. Appropriate for discharge with outpatient follow-up and strict return precautions. Pulse oximeter provided, instructed to return to the emergency room for pulse oximeter less than 92%. Instructed follow with primary care provider, ideally to be seen within 1 week. Instructed to return to the emergency room for new or worsening symptoms including but not limited to chest pain, shortness of breath, abdominal pain, severe nausea vomiting, coughing blood, calf swelling or pain, any other symptoms she is concerned about. Verbal and written discharge instructions and return precautions given. COVID-19 PCR test sent. FINAL IMPRESSION      1. Upper respiratory tract infection, unspecified type    2.  Person under investigation for COVID-19          DISPOSITION/PLAN   DISPOSITION Decision To Discharge 03/02/2021 10:52:55 AM      PATIENT REFERREDTO:  KAITY Oliveira - CNP  [address] [place] [address]    Call in 1 day        DISCHARGE MEDICATIONS:  Discharge Medication List as of 3/2/2021 10:50 AM      START taking these medications    Details   aspirin EC 81 MG EC tablet Take 1 tablet by mouth daily, Disp-30 tablet, R-0Normal      ondansetron (ZOFRAN ODT) 4 MG disintegrating tablet Take 1-2 tablets by mouth every 8 hours as needed for Nausea or Vomiting May Sub regular tablet (non-ODT) if insurance does not cover ODT., Disp-12 tablet, R-0Normal             DISCONTINUED MEDICATIONS:  Discharge Medication List as of 3/2/2021 10:50 AM                 (Please note that portions of this note were completed with a voice recognition program.  Efforts were made to edit the dictations but occasionally words are mis-transcribed.)    MONIKA Kee (electronically signed)         MONIKA Kee  03/02/21 2036

## 2021-03-03 ENCOUNTER — HOSPITAL ENCOUNTER (EMERGENCY)
Age: 26
Discharge: LEFT BEFORE DISCHARGE | End: 2021-03-03
Payer: COMMERCIAL

## 2021-03-03 ENCOUNTER — CARE COORDINATION (OUTPATIENT)
Dept: CARE COORDINATION | Age: 26
End: 2021-03-03

## 2021-03-03 VITALS
TEMPERATURE: 98.2 F | DIASTOLIC BLOOD PRESSURE: 82 MMHG | HEIGHT: 60 IN | OXYGEN SATURATION: 99 % | SYSTOLIC BLOOD PRESSURE: 124 MMHG | BODY MASS INDEX: 19.63 KG/M2 | WEIGHT: 100 LBS | HEART RATE: 104 BPM

## 2021-03-03 DIAGNOSIS — Z53.21 PATIENT LEFT WITHOUT BEING SEEN: Primary | ICD-10-CM

## 2021-03-03 LAB — SARS-COV-2: NORMAL

## 2021-03-03 ASSESSMENT — PAIN DESCRIPTION - DESCRIPTORS: DESCRIPTORS: ACHING

## 2021-03-03 ASSESSMENT — PAIN DESCRIPTION - LOCATION: LOCATION: GENERALIZED

## 2021-03-03 ASSESSMENT — PAIN DESCRIPTION - PAIN TYPE: TYPE: ACUTE PAIN

## 2021-03-04 ENCOUNTER — CARE COORDINATION (OUTPATIENT)
Dept: CARE COORDINATION | Age: 26
End: 2021-03-04

## 2021-03-04 NOTE — ED NOTES
I signed up for patient while they were in waiting room, they left prior to being called back, I did not see this patient, they left without being seen.      Juvencio Hernandez PA-C  03/03/21 9029

## 2021-09-07 ENCOUNTER — HOSPITAL ENCOUNTER (EMERGENCY)
Age: 26
Discharge: HOME | End: 2021-09-07
Payer: COMMERCIAL

## 2021-09-07 VITALS
TEMPERATURE: 97.9 F | BODY MASS INDEX: 20.91 KG/M2 | DIASTOLIC BLOOD PRESSURE: 71 MMHG | HEART RATE: 90 BPM | RESPIRATION RATE: 16 BRPM | WEIGHT: 106.48 LBS | SYSTOLIC BLOOD PRESSURE: 114 MMHG | HEIGHT: 60 IN | OXYGEN SATURATION: 98 %

## 2021-09-07 DIAGNOSIS — L08.9 LOCAL INFECTION OF SKIN AND SUBCUTANEOUS TISSUE: ICD-10-CM

## 2021-09-07 DIAGNOSIS — Z78.9 MULTIPLE BODY PIERCINGS: Primary | ICD-10-CM

## 2021-09-07 LAB
BILIRUBIN URINE: NEGATIVE
BLOOD, URINE: NEGATIVE
CLARITY: CLEAR
COLOR: YELLOW
GLUCOSE URINE: NEGATIVE MG/DL
KETONES, URINE: NEGATIVE MG/DL
LEUKOCYTE ESTERASE, URINE: NEGATIVE
MICROSCOPIC EXAMINATION: NORMAL
NITRITE, URINE: NEGATIVE
PH UA: 6 (ref 5–8)
PROTEIN UA: NEGATIVE MG/DL
SPECIFIC GRAVITY UA: 1.01 (ref 1–1.03)
URINE REFLEX TO CULTURE: NORMAL
URINE TYPE: NORMAL
UROBILINOGEN, URINE: 0.2 E.U./DL

## 2021-09-07 PROCEDURE — 81003 URINALYSIS AUTO W/O SCOPE: CPT

## 2021-09-07 PROCEDURE — 6370000000 HC RX 637 (ALT 250 FOR IP): Performed by: PHYSICIAN ASSISTANT

## 2021-09-07 PROCEDURE — 99284 EMERGENCY DEPT VISIT MOD MDM: CPT

## 2021-09-07 PROCEDURE — 10120 INC&RMVL FB SUBQ TISS SMPL: CPT

## 2021-09-07 RX ORDER — IBUPROFEN 600 MG/1
600 TABLET ORAL ONCE
Status: COMPLETED | OUTPATIENT
Start: 2021-09-07 | End: 2021-09-07

## 2021-09-07 RX ORDER — CEPHALEXIN 500 MG/1
500 CAPSULE ORAL 4 TIMES DAILY
Qty: 40 CAPSULE | Refills: 0 | Status: SHIPPED | OUTPATIENT
Start: 2021-09-07 | End: 2021-09-17

## 2021-09-07 RX ADMIN — IBUPROFEN 600 MG: 600 TABLET, FILM COATED ORAL at 10:52

## 2021-09-07 ASSESSMENT — PAIN DESCRIPTION - PAIN TYPE: TYPE: ACUTE PAIN

## 2021-09-07 ASSESSMENT — PAIN DESCRIPTION - FREQUENCY: FREQUENCY: CONTINUOUS

## 2021-09-07 ASSESSMENT — PAIN DESCRIPTION - LOCATION: LOCATION: BACK

## 2021-09-07 ASSESSMENT — PAIN DESCRIPTION - DESCRIPTORS: DESCRIPTORS: SHARP

## 2021-09-07 ASSESSMENT — PAIN SCALES - GENERAL: PAINLEVEL_OUTOF10: 8

## 2021-09-07 NOTE — ED TRIAGE NOTES
Pt reports she had a corset piercing 4 days ago and it is causing pain and chills, would like removed.

## 2021-09-07 NOTE — ED PROVIDER NOTES
**ADVANCED PRACTICE PROVIDER, I HAVE EVALUATED THIS PATIENT**        [address]      Pt Name: John Rodriguez  GG  [place] 1995  Date of evaluation: 2021  Provider: Oralia Harrison PA-C      Chief Complaint:    Chief Complaint   Patient presents with    Foreign Body in Skin     piercing on back 4 days ago, is now infected, would like removed         Nursing Notes, Past Medical Hx, Past Surgical Hx, Social Hx, Allergies, and Family Hx were all reviewed and agreed with or any disagreements were addressed in the HPI.    HPI: (Location, Duration, Timing, Severity, Quality, Assoc Sx, Context, Modifying factors)    Chief Complaint of infected piercings    This is a  26 y.o. female who presents indicating that she got numerous piercings on her back last weekend for free from somebody that she met. She states now it feels like they are infected and she would like them all removed. She states that she feels little bit chilled especially if something rubs in one of his rings. She is having pain associated 8 out of 10. She took no medication to help with so far today. She denies any burning on urination or difficulty passing urine. She denies any particular congestion compared to usual or suspicion of having COVID-19. She states that she has had the COVID-19 vaccines. Patient indicates that she is hoping to have the rings removed today and potential infection from those sites addressed.   Tenderness is local constant, worse if something brushes against one of the rings and better at rest.    PastMedical/Surgical History:      Diagnosis Date    ADD (attention deficit disorder)     Anxiety     B[facility] affective ([facility] [address]     Depression     Drug abuse, marijuana 2015    Hormone disorder     PTSD (post-traumatic stress disorder)     Urinary tract infection     Yeast infection          Procedure Laterality Date    ESOPHAGUS SURGERY  2016    INDUCED   2014    WISDOM TOOTH EXTRACTION  2018       Medications:  Previous Medications    ARIPIPRAZOLE (ABILIFY) 5 MG TABLET    Take 1 tablet by mouth daily    ASPIRIN EC 81 MG EC TABLET    Take 1 tablet by mouth daily    FERROUS SULFATE 27 MG TABS    Take by mouth daily    ONDANSETRON (ZOFRAN ODT) 4 MG DISINTEGRATING TABLET    Take 1-2 tablets by mouth every 8 hours as needed for Nausea or Vomiting May Sub regular tablet (non-ODT) if insurance does not cover ODT. SERTRALINE (ZOLOFT) 100 MG TABLET    Take 1 tablet by mouth daily         Review of Systems:  (2-9 systems needed)  Review of Systems  Positive history as above with no measured fevers at home but positive for discomfort as above as well as feeling of chills from time to time. No headache vision change neck pain or stiffness runny nose cough congestion or feeling of being ill overall. No dizziness or confusion syncope or near syncope. .  No nausea or vomiting difficulty passing urine burning on urination acute stool changes or extremity changes. \"Positives and Pertinent negatives as per HPI\"    Physical Exam:  Physical Exam   General Appearance: This is a well nourished adult female, non-toxic and well hydrated. Head:  Normocephalic and atraumatic   Eyes:  PERRLA, with conjunctiva and sclera clear   ENT: Mucous membranes moist.   Neck: Supple with normal ROM and no lymphadenopathy or meningeal signs     Lungs:   Clear to auscultation bilaterally   Chest Wall:  No tenderness. Heart:  RRR   Abdomen:   Soft, non-tender, with normal BS. Extremities: Normal ROM without pain. Skin:  Warm and moist.  Patient has 10 rings that are placed through the skin on her back a few of which have some mild induration and erythema around them as well as tenderness associated. No active oozing. Neurologic: Alert and oriented X 3. No focal findings.   Motor grossly normal.  Speech clear         MEDICAL DECISION MAKING    Vitals:    Vitals:    09/07/21 0910   BP: 117/77   Pulse: 96   Resp: 18   Temp: 96.3 °F (35.7 °C)   TempSrc: Oral   SpO2: 94%   Weight: 106 lb 7.7 oz (48.3 kg)   Height: 5' (1.524 m)       LABS:  Labs Reviewed   URINE RT REFLEX TO CULTURE    Narrative:     Performed at:  [address] [place] [facility]   Phone [phone]        Remainder of labs reviewed and were negative at this time or not returned at the time of this note. RADIOLOGY:   Non-plain film images such as CT, Ultrasound and MRI are read by the radiologist. Bambi Link PA-C have directly visualized the radiologic plain film image(s) with the below findings:      Interpretation per the Radiologist below, if available at the time of this note:    No orders to display        No results found. MEDICAL DECISION MAKING / ED COURSE:      PROCEDURES:   Procedures  This patient was given ibuprofen for comfort and she she gives verbal consent to having the rings in her back removed. Gently each of the 10 rings was splayed and the central ball removed so that the rings were slid out of the skin. 3 of the ring locations then have a small amount of pus that is easily exuded from the piercings. These areas are cleansed with soapy water and dab dry before bandages were placed on them. Patient tolerates this well. No complications. Patient was given:  Medications   ibuprofen (ADVIL;MOTRIN) tablet 600 mg (600 mg Oral Given 9/7/21 1052)     This patient presents as above and evaluation and treatment is begun here. Urinalysis obtained and is negative for evidence for urinary tract infection. Patient does have the multiple body piercings as above with the 10 rings in the skin of her back. These are removed and bandaged as indicated above.   Conservative home care now is recommended to the patient including some p.o. antibiotics use at home and she verbalizes understanding and agreement with the above and the following plan. The patient tolerated their visit well. I evaluated the patient. The physician was available for consultation as needed. The patient and / or the family were informed of the results of any tests, a time was given to answer questions, a plan was proposed and they agreed with plan. Good condition to home gently wash the affected areas a couple of times daily and apply new dry bandage afterwards. Take the antibiotic as written and over-the-counter medication for comfort if needed. Monitor for gradual improvement. Follow with your family doctor for further care and treatment if needed. Return to the emergency department for any emergency worsening or concern    CLINICAL IMPRESSION:  1. Multiple body piercings    2.  Local infection of skin and subcutaneous tissue        DISPOSITION Decision To Discharge 09/07/2021 11:39:41 AM      PATIENT REFERRED TO:  KAITY Larsen - CNP  [address] [facility] [phone]      As needed      DISCHARGE MEDICATIONS:  New Prescriptions    CEPHALEXIN (KEFLEX) 500 MG CAPSULE    Take 1 capsule by mouth 4 times daily for 10 days       DISCONTINUED MEDICATIONS:  Discontinued Medications    No medications on file              (Please note the MDM and HPI sections of this note were completed with a voice recognition program.  Efforts were made to edit the dictations but occasionally words are mis-transcribed.)    Electronically signed, Tony Caceres PA-C,          Tony Caceres PA-C  09/07/21 2026

## 2022-06-07 ENCOUNTER — HOSPITAL ENCOUNTER (EMERGENCY)
Age: 27
Discharge: HOME | End: 2022-06-07
Payer: COMMERCIAL

## 2022-06-07 VITALS
DIASTOLIC BLOOD PRESSURE: 77 MMHG | SYSTOLIC BLOOD PRESSURE: 122 MMHG | RESPIRATION RATE: 16 BRPM | HEART RATE: 75 BPM | TEMPERATURE: 97.1 F | OXYGEN SATURATION: 98 %
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
## 2022-06-07 DIAGNOSIS — S16.1XXA STRAIN OF NECK MUSCLE, INITIAL ENCOUNTER: ICD-10-CM

## 2022-06-07 PROCEDURE — 99283 EMERGENCY DEPT VISIT LOW MDM: CPT

## 2022-06-07 RX ORDER — IBUPROFEN 400 MG/1
400 TABLET ORAL ONCE
Status: DISCONTINUED | OUTPATIENT
Start: 2022-06-07 | End: 2022-06-07 | Stop reason: HOSPADM

## 2022-06-07 ASSESSMENT — ENCOUNTER SYMPTOMS
NAUSEA: 0
ABDOMINAL PAIN: 0
CHEST TIGHTNESS: 0
VOICE CHANGE: 0
WHEEZING: 0
APNEA: 0
BACK PAIN: 1
PHOTOPHOBIA: 0
EYE DISCHARGE: 0
FACIAL SWELLING: 0
CHOKING: 0
EYE PAIN: 0
COLOR CHANGE: 0
SHORTNESS OF BREATH: 0
DIARRHEA: 0
SINUS PAIN: 0
VOMITING: 0
RHINORRHEA: 0
COUGH: 0

## 2022-06-07 ASSESSMENT — LIFESTYLE VARIABLES: HOW OFTEN DO YOU HAVE A DRINK CONTAINING ALCOHOL: NEVER
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
## 2023-01-21 ENCOUNTER — APPOINTMENT (OUTPATIENT)
Dept: CT IMAGING | Age: 28
End: 2023-01-21
Payer: COMMERCIAL

## 2023-01-21 ENCOUNTER — HOSPITAL ENCOUNTER (EMERGENCY)
Age: 28
Discharge: HOME | End: 2023-01-22
Attending: STUDENT IN AN ORGANIZED HEALTH CARE EDUCATION/TRAINING PROGRAM
Payer: COMMERCIAL

## 2023-01-21 DIAGNOSIS — K76.9 LIVER LESION: ICD-10-CM

## 2023-01-21 DIAGNOSIS — T50.901A ACCIDENTAL OVERDOSE, INITIAL ENCOUNTER: Primary | ICD-10-CM

## 2023-01-21 LAB
A/G RATIO: 1.9 (ref 1.1–2.2)
ACETAMINOPHEN LEVEL: <5 UG/ML (ref 10–30)
ALBUMIN SERPL-MCNC: 5 G/DL (ref 3.4–5)
ALP BLD-CCNC: 52 U/L (ref 40–129)
ALT SERPL-CCNC: 37 U/L (ref 10–40)
ANION GAP SERPL CALCULATED.3IONS-SCNC: 12 MMOL/L (ref 3–16)
AST SERPL-CCNC: 49 U/L (ref 15–37)
BASOPHILS ABSOLUTE: 0 K/UL (ref 0–0.2)
BASOPHILS RELATIVE PERCENT: 0.5 %
BILIRUB SERPL-MCNC: 0.7 MG/DL (ref 0–1)
BUN BLDV-MCNC: 7 MG/DL (ref 7–20)
CALCIUM SERPL-MCNC: 9.1 MG/DL (ref 8.3–10.6)
CHLORIDE BLD-SCNC: 103 MMOL/L (ref 99–110)
CO2: 21 MMOL/L (ref 21–32)
CREAT SERPL-MCNC: 0.7 MG/DL (ref 0.6–1.1)
EOSINOPHILS ABSOLUTE: 0 K/UL (ref 0–0.6)
EOSINOPHILS RELATIVE PERCENT: 0.6 %
ETHANOL: NORMAL MG/DL (ref 0–0.08)
GFR SERPL CREATININE-BSD FRML MDRD: >60 ML/MIN/{1.73_M2}
GLUCOSE BLD-MCNC: 102 MG/DL (ref 70–99)
HCG QUALITATIVE: NEGATIVE
HCT VFR BLD CALC: 37.6 % (ref 36–48)
HEMOGLOBIN: 12.8 G/DL (ref 12–16)
INFLUENZA A: NOT DETECTED
INFLUENZA B: NOT DETECTED
LACTIC ACID: 1 MMOL/L (ref 0.4–2)
LIPASE: 28 U/L (ref 13–60)
LYMPHOCYTES ABSOLUTE: 2.3 K/UL (ref 1–5.1)
LYMPHOCYTES RELATIVE PERCENT: 35.8 %
MCH RBC QN AUTO: 30.8 PG (ref 26–34)
MCHC RBC AUTO-ENTMCNC: 34 G/DL (ref 31–36)
MCV RBC AUTO: 90.7 FL (ref 80–100)
MONOCYTES ABSOLUTE: 0.4 K/UL (ref 0–1.3)
MONOCYTES RELATIVE PERCENT: 6.3 %
NEUTROPHILS ABSOLUTE: 3.6 K/UL (ref 1.7–7.7)
NEUTROPHILS RELATIVE PERCENT: 56.8 %
PDW BLD-RTO: 14.6 % (ref 12.4–15.4)
PLATELET # BLD: 234 K/UL (ref 135–450)
PMV BLD AUTO: 8.5 FL (ref 5–10.5)
POTASSIUM REFLEX MAGNESIUM: 4 MMOL/L (ref 3.5–5.1)
RBC # BLD: 4.15 M/UL (ref 4–5.2)
SALICYLATE, SERUM: <0.3 MG/DL (ref 15–30)
SARS-COV-2 RNA, RT PCR: NOT DETECTED
SODIUM BLD-SCNC: 136 MMOL/L (ref 136–145)
TOTAL PROTEIN: 7.7 G/DL (ref 6.4–8.2)
WBC # BLD: 6.3 K/UL (ref 4–11)

## 2023-01-21 PROCEDURE — 82077 ASSAY SPEC XCP UR&BREATH IA: CPT

## 2023-01-21 PROCEDURE — 87636 SARSCOV2 & INF A&B AMP PRB: CPT

## 2023-01-21 PROCEDURE — 74177 CT ABD & PELVIS W/CONTRAST: CPT

## 2023-01-21 PROCEDURE — 80179 DRUG ASSAY SALICYLATE: CPT

## 2023-01-21 PROCEDURE — 85025 COMPLETE CBC W/AUTO DIFF WBC: CPT

## 2023-01-21 PROCEDURE — 6360000004 HC RX CONTRAST MEDICATION: Performed by: STUDENT IN AN ORGANIZED HEALTH CARE EDUCATION/TRAINING PROGRAM

## 2023-01-21 PROCEDURE — 96374 THER/PROPH/DIAG INJ IV PUSH: CPT

## 2023-01-21 PROCEDURE — 6360000002 HC RX W HCPCS: Performed by: STUDENT IN AN ORGANIZED HEALTH CARE EDUCATION/TRAINING PROGRAM

## 2023-01-21 PROCEDURE — 80143 DRUG ASSAY ACETAMINOPHEN: CPT

## 2023-01-21 PROCEDURE — 99285 EMERGENCY DEPT VISIT HI MDM: CPT

## 2023-01-21 PROCEDURE — 72125 CT NECK SPINE W/O DYE: CPT

## 2023-01-21 PROCEDURE — 93005 ELECTROCARDIOGRAM TRACING: CPT | Performed by: STUDENT IN AN ORGANIZED HEALTH CARE EDUCATION/TRAINING PROGRAM

## 2023-01-21 PROCEDURE — 84703 CHORIONIC GONADOTROPIN ASSAY: CPT

## 2023-01-21 PROCEDURE — 83690 ASSAY OF LIPASE: CPT

## 2023-01-21 PROCEDURE — 36415 COLL VENOUS BLD VENIPUNCTURE: CPT

## 2023-01-21 PROCEDURE — 70450 CT HEAD/BRAIN W/O DYE: CPT

## 2023-01-21 PROCEDURE — 80053 COMPREHEN METABOLIC PANEL: CPT

## 2023-01-21 PROCEDURE — 83605 ASSAY OF LACTIC ACID: CPT

## 2023-01-21 PROCEDURE — 2580000003 HC RX 258: Performed by: STUDENT IN AN ORGANIZED HEALTH CARE EDUCATION/TRAINING PROGRAM

## 2023-01-21 RX ORDER — 0.9 % SODIUM CHLORIDE 0.9 %
1000 INTRAVENOUS SOLUTION INTRAVENOUS ONCE
Status: COMPLETED | OUTPATIENT
Start: 2023-01-21 | End: 2023-01-21

## 2023-01-21 RX ORDER — ONDANSETRON 2 MG/ML
4 INJECTION INTRAMUSCULAR; INTRAVENOUS EVERY 6 HOURS PRN
Status: DISCONTINUED | OUTPATIENT
Start: 2023-01-21 | End: 2023-01-22 | Stop reason: HOSPADM

## 2023-01-21 RX ORDER — HYDROXYZINE PAMOATE 50 MG/1
100 CAPSULE ORAL 3 TIMES DAILY PRN
COMMUNITY

## 2023-01-21 RX ADMIN — IOPAMIDOL 75 ML: 755 INJECTION, SOLUTION INTRAVENOUS at 19:44

## 2023-01-21 RX ADMIN — SODIUM CHLORIDE 1000 ML: 9 INJECTION, SOLUTION INTRAVENOUS at 19:13

## 2023-01-21 RX ADMIN — ONDANSETRON HYDROCHLORIDE 4 MG: 2 INJECTION, SOLUTION INTRAMUSCULAR; INTRAVENOUS at 21:48

## 2023-01-21 ASSESSMENT — PAIN SCALES - GENERAL: PAINLEVEL_OUTOF10: 4

## 2023-01-21 ASSESSMENT — PAIN - FUNCTIONAL ASSESSMENT: PAIN_FUNCTIONAL_ASSESSMENT: NONE - DENIES PAIN

## 2023-01-21 ASSESSMENT — PAIN DESCRIPTION - LOCATION: LOCATION: ABDOMEN

## 2023-01-21 NOTE — ED PROVIDER NOTES
Emergency Department Encounter    Patient: Pippa Gan  MRN: [id number]  : 1995  Date of Evaluation: 2023  ED Provider:  Shana Vick MD    Triage Chief Complaint:   Ingestion (Took 2 ? Mg Clonarzepam, 2 ? \"Mood stabilizers \" and 6 100 mg Vistaril po at 1400 today approx 4 1/2 hrs ago. Denies SI, just wanted to calm her anxiety down. Does not health a mental health provider. Denies drug or alcohol addiction.  )    [place]:  Pippa Gan is a 33 y.o. female with history seen below presenting with overdose. Patient states she has had increased anxiety and to help her self calm down she took 6 Vistaril as well as 2 clonazepam and 2 of her mood stabilizer. She is unsure if it was Abilify her Zoloft. States she did not take anything else. Patient does seem relatively reliable. States she was not trying to harm herself. States she has no homicidal ideation, hallucinations or paranoia. Denies alcohol or drug use other than marijuana. States currently she feels mildly lightheaded and has nausea. Patient states she does have some abdominal pain but states it feels more crampy in nature diffuse. Denies any pelvic pain. Denies vertigo, headache, blurred vision, focal neurodeficits, motor or sensory changes. Denies chest pain or shortness of breath. Denies diarrhea constipation or blood or melena stools. Denies urinary symptoms. Denies vaginal bleeding or discharge. States she did wake up on the ground after she took these medications and is unsure if she hit her head. States she also has some mild neck pain in the left paraspinous region after the fall. Patient states after this she was concerned and that she took too much medication so called EMS to come in here for further evaluation.     ROS - see HPI, below listed is current ROS at time of my eval:  At least 14 systems reviewed, negative other HPI    Past Medical History:   Diagnosis Date    ADD (attention deficit disorder) Anxiety     Bipolar affective ([facility] [address]     Depression     Drug abuse, marijuana 2015    Hormone disorder     PTSD (post-traumatic stress disorder)     Urinary tract infection     Yeast infection      Past Surgical History:   Procedure Laterality Date    ESOPHAGUS SURGERY  2016    INDUCED   2014    WISDOM TOOTH EXTRACTION  2018     Family History   Problem Relation Age of Onset    High Blood Pressure Mother     High Cholesterol Mother     Mental Illness Mother     High Blood Pressure Father     High Cholesterol Father     Mental Illness Father     OCD Sister     High Blood Pressure Maternal Grandmother     Scoliosis Maternal Grandmother     Heart Disease Maternal Grandfather     Clotting Disorder Maternal Grandfather     Mental Illness Maternal Grandfather     Cancer Paternal Grandfather      Social History     Socioeconomic History    Marital status: Single     Spouse name: Not on file    Number of children: Not on file    Years of education: Not on file    Highest education level: Not on file   Occupational History    Not on file   Tobacco Use    Smoking status: Former     Years: 10.00     Types: Cigarettes     Quit date: 2018     Years since quittin.5    Smokeless tobacco: Never   Vaping Use    Vaping Use: Never used   Substance and Sexual Activity    Alcohol use: Not Currently     Comment: holidays    Drug use: Yes     Types: Marijuana (Weed)     Comment: states she last smoked 2 days ago    Sexual activity: Yes     Partners: Male   Other Topics Concern    Not on file   Social History Narrative    Not on file     Social Determinants of Health     Financial Resource Strain: Not on file   Food Insecurity: Not on file   Transportation Needs: Not on file   Physical Activity: Not on file   Stress: Not on file   Social Connections: Not on file   Intimate Partner Violence: Not on file   Housing Stability: Not on file     No current facility-administered medications for this encounter.      Current Outpatient Medications   Medication Sig Dispense Refill    aspirin EC 81 MG EC tablet Take 1 tablet by mouth daily (Patient not taking: Reported on 6/7/2022) 30 tablet 0    ondansetron (ZOFRAN ODT) 4 MG disintegrating tablet Take 1-2 tablets by mouth every 8 hours as needed for Nausea or Vomiting May Sub regular tablet (non-ODT) if insurance does not cover ODT. (Patient not taking: Reported on 6/7/2022) 12 tablet 0    sertraline (ZOLOFT) 100 MG tablet Take 1 tablet by mouth daily (Patient not taking: Reported on 6/7/2022) 30 tablet 0    ARIPiprazole (ABILIFY) 5 MG tablet Take 1 tablet by mouth daily 30 tablet 0    ferrous sulfate 27 MG TABS Take by mouth daily (Patient not taking: Reported on 6/7/2022)       Allergies   Allergen Reactions    Latex Itching and Swelling    Monistat [Miconazole] Dermatitis       Nursing Notes Reviewed    Physical Exam:  Triage VS:    ED Triage Vitals   Enc Vitals Group      BP       Pulse       Resp       Temp       Temp src       SpO2       Weight       Height       Head Circumference       Peak Flow       Pain Score       Pain Loc       Pain Edu? Excl. in [address]? My pulse ox interpretation is - normal    General appearance:  No acute distress. Skin:  Warm. Dry. Eye:  Extraocular movements intact. Ears, nose, mouth and throat:  Oral mucosa moist   Neck:  Trachea midline. Extremity:  No swelling. Normal ROM     Heart:  Regular rate and rhythm, normal S1 & S2, no extra heart sounds. Perfusion:  intact  Respiratory:  Lungs clear to auscultation bilaterally. Respirations nonlabored. Abdominal:  Normal bowel sounds. Soft.,  Nondistended, mild discomfort in the upper abdomen with out rebound or guarding  Back:  No CVA tenderness to palpation no tenderness palpation of the CTL spine  Neurological:  Alert and oriented times 3. No focal neuro deficits.   No facial asymmetry, normal sensation light touch of the face, extraocular eye movements are intact, pupils are 3 mm reactive bilaterally, no pronator drift of the bilateral upper or lower extremities, normal sensation light touch of the bilateral upper and lower extremities, normal finger-nose-finger and heel shin, no dysarthria dysphagia             Psychiatric: Mildly anxious, otherwise appropriate, denies SI, HI, loose Nations or paranoia    I have reviewed and interpreted all of the currently available lab results from this visit (if applicable):  No results found for this visit on 01/21/23. Radiographs (if obtained):  Radiologist's Report Reviewed:  No results found. EKG (if obtained): (All EKG's are interpreted by myself in the absence of a cardiologist)  Normal sinus rhythm, ventricular rate 65, AZ interval 156, QRS duration 78, QTc 405, no significant ST elevation or depression, no significant ischemic change    MDM:    56-year-old female present for evaluation after overdose. Extremity seen above. Vitals on presentation are reassuring and patient afebrile satting on room air. Physical exam can be seen above. Patient does have some discomfort subjectively in the abdomen without rebound or guarding, lungs are clear to auscultation, cardiac exam is reassuring, neuro exam is nonfocal.  Patient states she did fall out of bed and hit her head CT head and C-spine are nonacute. CT abdomen pelvis is also nonacute there is a hyperdense lesion in the lateral right liver favoring hepatic angioma or adenoma with recommended CT or MRI hepatic mass protocol in 3 to 6 months. CBC is reassuring. Patient has no leukocytosis. Hemoglobin is within normal limits. CMP is overall reassuring other than a very mildly elevated AST of 49. Lactate is not elevated. Ethanol is nondetected. Salicylate and acetaminophen level are negative. COVID and flu testing are negative. Pregnancy testing is negative.   Discussed with poison control who suggested 8-hour monitor in the ED which will be around 2 AM.  Patient will be signed out to oncoming physician Dr. Sheila Tellez. Behavioral health is already seen and evaluated patient and does not believe patient requires inpatient psychiatric stay. Discussed with patient this does seem accidental in nature with no intent to self-harm. If patient remains well-appearing patient will likely be discharged home. Clinical Impression:  1. Accidental overdose, initial encounter    2. Liver lesion          Comment: Please note this report has been produced using speech recognition software and may contain errors related to that system including errors in grammar, punctuation, and spelling, as well as words and phrases that may be inappropriate. Efforts were made to edit the dictations.         Rashmi Sawyer MD  01/21/23 6044

## 2023-01-22 VITALS
WEIGHT: 100 LBS | TEMPERATURE: 98.6 F | HEART RATE: 83 BPM | HEIGHT: 65 IN | RESPIRATION RATE: 12 BRPM | BODY MASS INDEX: 16.66 KG/M2 | SYSTOLIC BLOOD PRESSURE: 115 MMHG | OXYGEN SATURATION: 97 % | DIASTOLIC BLOOD PRESSURE: 74 MMHG

## 2023-01-22 LAB
EKG ATRIAL RATE: 65 BPM
EKG DIAGNOSIS: NORMAL
EKG P-R INTERVAL: 156 MS
EKG Q-T INTERVAL: 390 MS
EKG QRS DURATION: 78 MS
EKG QTC CALCULATION (BAZETT): 405 MS
EKG R AXIS: 74 DEGREES
EKG T AXIS: 52 DEGREES
EKG VENTRICULAR RATE: 65 BPM

## 2023-01-22 NOTE — DISCHARGE INSTRUCTIONS
Follow-up with primary care physician early next week for reevaluation.  Return to emergency department for worsening abdominal pain, uncontrolled nausea vomiting, chest pain or shortness of breath, lightheadedness or dizziness or any new change or worsening symptoms were always here for reevaluation never hesitate to return.    Follow up with resources with seven days of discharge       You are being referred to the Intensive Outpatient Program here at [facility]. Please contact the Intensive Outpatient  at [phone] to schedule your first appointment.       Outpatient Mental Health Treatment Services    Mental Health Therapy and Psychiatry (Medication Management)  [facility]  Location: [address] [place] [address]  Phone: [phone]    [facility]  Location: [facility] [address], [address].    Phone: [phone]    [facility]  Location: Multiple offices in the [facility]  Phone: [phone] or [phone]    [facility]  Locations: Multiple locations in [facility]  Phone: [phone]    [facility]  Location: [address] [place] [address]  Phone: [phone]    [facility]  Location: Multiple offices in [place]   Phone: [phone] ([phone])    [facility]  Location: [facility]  Phone: [phone]    [facility] [facility] ([facility])  Location: [facility] [place] [address]  Phone: [phone]    [facility]  Location: offices in [facility]  Phone: [phone]    [facility]  Location: [place], [place]  Phone: [phone]    Dr. Jerry Gomez   Location: [address] [address]    Phone: [phone]    [facility]  Location: [address] [place]  [address].    Phone: [phone]    Mental Health Therapy Only, No Psychiatry (Medication Management)    [facility]  [facility] [address] [place] [address]  Phone: [phone]    [facility]  Location: [address] [place] [address]  Phone: [phone]
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
## 2023-01-22 NOTE — ED NOTES
Pt attempted to give urine sample x 2 times still unable to urinate.      [address] Jean Contreras RN  01/21/23 1954

## 2023-01-22 NOTE — ED NOTES
Collateral Contact:  Name:Marcin JACOBS:[phone]  Relation to Patient: Boyfriend   Last Contact with Patient: tonight   Concerns: Raad Prather reports he has no safety concerns for pt. He reports pt can stay with him tonight and he can keep her safe. He reports he was out of town the past couple of days and thinks that might have triggered her anxiety. He reports he just got back tonight. He reports he has no firearms in the home. He reports they have been together for years and states this is the best he has seen pt. He reports he feels safe with pt being discharged.    Raad Prather is supportive of plan to  discharge  [facility]

## 2023-01-22 NOTE — ED PROVIDER NOTES
2:08 AM: I discussed the history, physical examination, laboratory and imaging studies, and treatment plan with Dr. Francisco J Inman. Yulisa Carlson was signed out to me in stable condition. Please see Dr. Naima Carver documentation for details of their history, physical, and laboratory studies. Upon re-examination, a summary of Dante Santos's history, physical examination, and studies are as follows: Patient is a 71-year-old female, presenting with concerns for overdose of multiple medications, denies attempt at self-harm and states that she was just trying to ease her anxiety. Labs were performed and reassuring. Poison control was contacted and recommended ED monitoring until 2 AM.  Patient was cleared from a psychiatric perspective prior to time of signout. Labs Reviewed   COMPREHENSIVE METABOLIC PANEL W/ REFLEX TO MG FOR LOW K - Abnormal; Notable for the following components:       Result Value    Glucose 102 (*)     AST 49 (*)     All other components within normal limits   ACETAMINOPHEN LEVEL - Abnormal; Notable for the following components:    Acetaminophen Level <5 (*)     All other components within normal limits   SALICYLATE LEVEL - Abnormal; Notable for the following components:    Salicylate, Serum <3.7 (*)     All other components within normal limits   COVID-19 & INFLUENZA COMBO   CBC WITH AUTO DIFFERENTIAL   LACTIC ACID   ETHANOL   HCG, SERUM, QUALITATIVE   LIPASE   URINE DRUG SCREEN   URINALYSIS     CT ABDOMEN PELVIS W IV CONTRAST Additional Contrast? None   Final Result   1. No acute findings. Normal appendix. 2. Hyperdense lesion at the lateral right liver, favoring hepatic hemangioma   or adenoma. Recommend attention on follow-up CT or MRI hepatic mass protocol   in 3-6 months. CT CERVICAL SPINE WO CONTRAST   Final Result   No acute abnormality of the cervical spine. CT HEAD WO CONTRAST   Final Result   No acute intracranial abnormality.            Patient was monitored until after 2 AM per poison control recommendations. She is stable to be discharged from a psychiatric standpoint. She is given referrals for outpatient follow-up. Given return precautions for the ED. 1. Accidental overdose, initial encounter    2.  Liver lesion           Debby Mathews MD  01/22/23 [id number]

## 2023-01-22 NOTE — ED NOTES
Level of Care Disposition: Discharge     Patient was seen by ED provider and [facility] staff. The case was presented to psychiatric provider on-call .   Based on the ED evaluation and information presented to the provider by Rm Chapin , it is the recommendation of the on call psychiatric provider that the patient be discharged from a psychiatric standpoint with the following referrals: Out pt mental health counseling referrals and [address]

## 2023-01-24 ENCOUNTER — HOSPITAL ENCOUNTER (OUTPATIENT)
Dept: PSYCHIATRY | Age: 28
Setting detail: THERAPIES SERIES
Discharge: HOME | End: 2023-01-24
Payer: COMMERCIAL

## 2023-01-24 ASSESSMENT — ANXIETY QUESTIONNAIRES
3. WORRYING TOO MUCH ABOUT DIFFERENT THINGS: 2
6. BECOMING EASILY ANNOYED OR IRRITABLE: 3
4. TROUBLE RELAXING: 2
IF YOU CHECKED OFF ANY PROBLEMS ON THIS QUESTIONNAIRE, HOW DIFFICULT HAVE THESE PROBLEMS MADE IT FOR YOU TO DO YOUR WORK, TAKE CARE OF THINGS AT HOME, OR GET ALONG WITH OTHER PEOPLE: VERY DIFFICULT
5. BEING SO RESTLESS THAT IT IS HARD TO SIT STILL: 2
GAD7 TOTAL SCORE: 15
7. FEELING AFRAID AS IF SOMETHING AWFUL MIGHT HAPPEN: 2
1. FEELING NERVOUS, ANXIOUS, OR ON EDGE: 2
2. NOT BEING ABLE TO STOP OR CONTROL WORRYING: 2

## 2023-01-24 ASSESSMENT — PATIENT HEALTH QUESTIONNAIRE - PHQ9: SUM OF ALL RESPONSES TO PHQ QUESTIONS 1-9: 18

## 2023-01-24 ASSESSMENT — SLEEP AND FATIGUE QUESTIONNAIRES
AVERAGE NUMBER OF SLEEP HOURS: 7
DO YOU HAVE DIFFICULTY SLEEPING: NO
DO YOU USE A SLEEP AID: NO

## 2023-01-25 ENCOUNTER — HOSPITAL ENCOUNTER (OUTPATIENT)
Dept: PSYCHIATRY | Age: 28
Setting detail: THERAPIES SERIES
Discharge: HOME | End: 2023-01-25
Payer: COMMERCIAL

## 2023-01-27 ENCOUNTER — HOSPITAL ENCOUNTER (OUTPATIENT)
Dept: PSYCHIATRY | Age: 28
Setting detail: THERAPIES SERIES
Discharge: HOME | End: 2023-01-27
Payer: COMMERCIAL

## 2023-01-27 VITALS
HEART RATE: 79 BPM | TEMPERATURE: 97.1 F | OXYGEN SATURATION: 100 % | RESPIRATION RATE: 16 BRPM | DIASTOLIC BLOOD PRESSURE: 79 MMHG | SYSTOLIC BLOOD PRESSURE: 124 MMHG

## 2023-01-27 DIAGNOSIS — F33.1 MODERATE EPISODE OF RECURRENT MAJOR DEPRESSIVE DISORDER (HCC): Primary | ICD-10-CM

## 2023-01-27 PROCEDURE — H2020 THER BEHAV SVC, PER DIEM: HCPCS

## 2023-01-27 RX ORDER — SERTRALINE HYDROCHLORIDE 100 MG/1
50 TABLET, FILM COATED ORAL DAILY
Qty: 30 TABLET | Refills: 0 | Status: SHIPPED | OUTPATIENT
Start: 2023-01-27
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
## 2023-01-27 NOTE — GROUP NOTE
Group Therapy Note    Date: 1/27/2023    Group Start Time: 11:15 AM  Group End Time: 12:15 PM  Group Topic: Music Therapy    [facility]    Nadeemrossyarnie Robinsx        Group Therapy Note    Music Therapy group consisted of deion analysis interventions and verbal process. Verbal processing included discussion of perspectives on past, present, and future. Members explored influences on perspectives, healthy expression of perspectives, role of autonomy and choice in challenging negative perspectives. Attendees: 4       Notes: This pt was present and actively engaged during time present in session. Pt explored values as primary influence on perspective of self and others. She reports low motivation to engage in self-care, social isolation influencing perspectives. Status After Intervention:  Improved    Participation Level:  Active Listener and Interactive    Participation Quality: Appropriate and Sharing      Speech:  normal      Thought Process/Content: Logical      Affective Functioning: Congruent      Mood: euthymic      Level of consciousness:  Alert and Attentive      Response to Learning: Capable of insight and Progressing to goal      Endings: None Reported    Modes of Intervention: Support, Socialization, Exploration, Clarifying, and Problem-solving      Discipline Responsible: Psychoeducational Specialist      Signature:  Tricia Marlow MM, MT-BC

## 2023-01-27 NOTE — GROUP NOTE
Group Therapy Note    Date: 1/27/2023    Group Start Time:  8:00 AM  Group End Time:  9:45 AM  Group Topic: Psychotherapy    [facility]    MELISSA Hernandez        Group Therapy Note  Group members engaged in psychotherapy agenda group. Group members identified their individual agendas/goals and utilized the duration of group time to process, give, and receive feedback related to progress of goals. Focus was placed on remaining in the here and now in order to apply feedback learned in group to actual life circumstances and situations. Attendees: 4       Patient's Goal:  \"My goal is to get the toxic people out of my life because, I am toxic as a result. \"    Notes:  Patient shared that she needs to learn how to distance herself from her toxic family members to find self-respect. Other group members shared their similar experiences and patient was able to accept their feedback and process the things they said. Patient determined that she want to move forward and learn how to be okay with being alone. She stated at the end of group that she is trying to achieve \"balance. \"    Status After Intervention:  Improved    Participation Level:  Active Listener and Interactive    Participation Quality: Appropriate, Attentive, Sharing, and Supportive      Speech:  normal      Thought Process/Content: Logical      Affective Functioning: Congruent      Mood: anxious and fearful      Level of consciousness:  Attentive      Response to Learning: Able to verbalize current knowledge/experience      Endings: None Reported    Modes of Intervention: Support and Exploration      Discipline Responsible: /Counselor      Signature:  MELISSA Hernandez [Normal] : normal rate, regular rhythm, normal S1 and S2 and no murmur heard [de-identified] : no CVA tesderness, no pain to palpation of back or ribs

## 2023-02-01 ENCOUNTER — HOSPITAL ENCOUNTER (OUTPATIENT)
Dept: PSYCHIATRY | Age: 28
Setting detail: THERAPIES SERIES
Discharge: HOME | End: 2023-02-01
Payer: COMMERCIAL

## 2023-02-01 PROCEDURE — H2020 THER BEHAV SVC, PER DIEM: HCPCS

## 2023-02-01 NOTE — GROUP NOTE
Group Therapy Note    Date: 2/1/2023    Group Start Time: 11:15 AM  Group End Time: 12:15 PM  Group Topic: Recreational    [facility]    Harrison Levin        Group Therapy Note    Group facilitator provided patients with copy of \"My Many Colored Days\" by Dr Perla Elliott. Group members explored color as representation of emotion. Facilitator provided instruction to create a marble painting to illustrate colors of personal identity, rolling the marbles using controlled movements to spread the paint over a body outline. Following completion of task, group members collaborated in process, discussing the experience of the intervention, elements of significance therein. Attendees: 6       Notes: Pt was present and actively engaged across discussions and creative intervention. Worked across time allotted. Positively social with peers during creative process. Active engagement throughout. No needs verbalized at conclusion of interventions. Status After Intervention:  Improved    Participation Level:  Active Listener and Interactive    Participation Quality: Appropriate and Attentive      Speech:  normal      Thought Process/Content: Logical      Affective Functioning: Congruent      Mood: euthymic      Level of consciousness:  Alert and Attentive      Response to Learning: Capable of insight and Progressing to goal      Endings: None Reported    Modes of Intervention: Support, Socialization, Exploration, Clarifying, and Problem-solving      Discipline Responsible: Psychoeducational Specialist      Signature:  Roge Lauren, MM, MT-BC

## 2023-02-01 NOTE — GROUP NOTE
Group Therapy Note    Date: 2/1/2023    Group Start Time: 10:00 AM  Group End Time: 11:00 AM  Group Topic: Psychoeducation    PUNEET CASTRO I    [address] [facility]        Group Therapy Note    Attendees: 7    Facilitator led a group discussion on the five stages of grief. Patients were given handouts on what each stage of grief is and common presentations of each stage. After a discussion on the handouts, patients explored their own thoughts, feelings, and behaviors for each stage. Patient processed how they experience grief with the group. Patient's Goal:  Patient will accept handouts on the stages of grief, be able to identify their own experiences with the stages of grief, and participate in the group discussion on the presented topic. Notes:  Patient participated in the group discussion and the activity. Patient opened up to the group her experience with grief and how it tied into her recent bout of depression symptoms. Patient acknowledged that their can be growth that stems from grief. Status After Intervention:  Improved    Participation Level:  Active Listener and Interactive    Participation Quality: Appropriate and Attentive      Speech:  normal      Thought Process/Content: Logical  Linear      Affective Functioning: Congruent      Mood: euthymic      Level of consciousness:  Alert, Oriented x4, and Attentive      Response to Learning: Able to verbalize current knowledge/experience, Able to change behavior, and Progressing to goal      Endings: None Reported    Modes of Intervention: Education, Exploration, and Problem-solving      Discipline Responsible: /Counselor      Signature:  [address] [facility]
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
## 2023-02-03 ENCOUNTER — HOSPITAL ENCOUNTER (OUTPATIENT)
Dept: PSYCHIATRY | Age: 28
Setting detail: THERAPIES SERIES
Discharge: HOME | End: 2023-02-03
Payer: COMMERCIAL

## 2023-02-03 PROCEDURE — H2020 THER BEHAV SVC, PER DIEM: HCPCS

## 2023-02-03 NOTE — GROUP NOTE
Group Therapy Note    Date: 2/3/2023    Group Start Time:  8:00 AM  Group End Time:  9:45 AM  Group Topic: Psychotherapy    [facility]    Corinne Ponto, MSW        Group Therapy Note  Group members engaged in psychotherapy agenda group. Group members identified their individual agendas/goals and utilized the duration of group time to process, give, and receive feedback related to progress of goals. Focus was placed on remaining in the here and now in order to apply feedback learned in group to actual life circumstances and situations. Attendees: 6       Patient's Goal:  Patient identified humor as her strength and stated she has also used it as a mask. Notes:  Patient shared with the group that she had a outpatient procedure where she had to be perfectly still for the dr to put a needle in her neck and it freaked her out. She reported that she did the 4 square breathing and returned to baseline and did not escalate. She shared her victory and the group members shared in validating her success. Patient shared with the group that she had BPD and needs to build self-awareness of her facial expressions and tome of voice when responding to others. She wanted them to be aware that she wants to be supportive and validating to others when they need it. Status After Intervention:  Improved    Participation Level:  Active Listener and Interactive    Participation Quality: Appropriate, Attentive, Sharing, and Supportive      Speech:  normal      Thought Process/Content: Logical      Affective Functioning: Congruent      Mood: euthymic      Level of consciousness:  Attentive      Response to Learning: Able to verbalize/acknowledge new learning      Endings: None Reported    Modes of Intervention: Support and Exploration      Discipline Responsible: /Counselor      Signature:  Corinne Ponto, MSW

## 2023-02-03 NOTE — GROUP NOTE
Group Therapy Note    Date: 2/3/2023    Group Start Time: 10:00 AM  Group End Time: 11:00 AM  Group Topic: Psychoeducation    [facility]    [address] [facility]        Group Therapy Note    Attendees: 6    Facilitator led a group discussion on strengths and encouraged group members to identify and acknowledge their own positive traits, characteristics, and strengths. Facilitator presented group members with collage materials and directed patients to create a strengths poster about themselves. Group ended with patients sharing their posters and discussing the strengths they self-identified. Patient's Goal:  Patient will be able to identify at least 3 personal strengths or positive characteristics about themselves. Patient will be able to express self-identified strengths through collage. Notes:  Patient successfully completed the strengths collage poster and shared thoughts behind the piece with the other group members. Patient was encouraging and support of other members and was receptive to encouragement and feedback from others. Status After Intervention:  Improved    Participation Level:  Active Listener and Interactive    Participation Quality: Appropriate, Sharing, and Supportive      Speech:  normal      Thought Process/Content: Logical  Linear      Affective Functioning: Congruent      Mood: euthymic      Level of consciousness:  Alert, Oriented x4, and Attentive      Response to Learning: Able to verbalize current knowledge/experience, Able to verbalize/acknowledge new learning, Capable of insight, Able to change behavior, and Progressing to goal      Endings: None Reported    Modes of Intervention: Support, Exploration, and Activity      Discipline Responsible: /Counselor      Signature:  [facility] [facility]

## 2023-02-03 NOTE — GROUP NOTE
Group Therapy Note    Date: 2/3/2023    Group Start Time: 11:15 AM  Group End Time: 12:15 PM  Group Topic: Recreational    [facility]    Harrison Levin        Group Therapy Note    Group members collaborated in [employer]. Group members used structure of Heads Up. Split into two teams, given opportunity to name their team. 1 member guesses music artists while other team members provide 3 clues. Concluded with members processing the use of gameplay and leisure in wellness. Attendees: 6       Notes:  Pt present and actively engaged during group gameplay. Active in process of leisure and recreation in wellness. No needs verbalized at conclusion of session. Status After Intervention:  Improved    Participation Level:  Active Listener and Interactive    Participation Quality: Appropriate and Attentive      Speech:  normal      Thought Process/Content: Logical      Affective Functioning: Congruent      Mood: euthymic      Level of consciousness:  Alert and Attentive      Response to Learning: Progressing to goal      Endings: None Reported    Modes of Intervention: Socialization, Problem-solving, Activity, and Media      Discipline Responsible: Psychoeducational Specialist      Signature:  Tabatha Armando MM, MT-BC

## 2023-02-06 ENCOUNTER — HOSPITAL ENCOUNTER (OUTPATIENT)
Dept: PSYCHIATRY | Age: 28
Setting detail: THERAPIES SERIES
Discharge: HOME | End: 2023-02-06
Payer: COMMERCIAL

## 2023-02-06 PROCEDURE — H2020 THER BEHAV SVC, PER DIEM: HCPCS

## 2023-02-06 NOTE — GROUP NOTE
Group Therapy Note    Date: 2/6/2023    Group Start Time: 10:00 AM  Group End Time: 10:45 AM  Group Topic: Psychoeducation    STEFF[facility] GLORIA Pacheco        Group Therapy Note    Attendees: 5    Facilitator led a group discussion on the [employer] as it applies to practicing self-love. Patients reviewed definitions of each love language and discussed an area that is significant to them. Patients were provided with art supplies and instructed to create an art piece that reflects how patients plan to practice self-love in their chosen love language. Group ended with a discussion of the art work. Patient's Goal:  Patient will be able to identify one of the five love languages that is significant to them. The patient will then create an art piece on how they plan to practice self-love to meet the love language need. Notes:  Patient was able to identify a love language that is most significant and create an art piece to reflect how the patient plans to practice that form of self-love. Status After Intervention:  Improved    Participation Level:  Active Listener    Participation Quality: Appropriate, Attentive, and Sharing      Speech:  normal      Thought Process/Content: Logical  Linear      Affective Functioning: Congruent      Mood: euthymic      Level of consciousness:  Alert, Oriented x4, and Attentive      Response to Learning: Able to verbalize current knowledge/experience, Able to verbalize/acknowledge new learning, and Progressing to goal      Endings: None Reported    Modes of Intervention: Exploration and Activity      Discipline Responsible: /Counselor      Signature:  DAVID Chou
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
## 2023-02-06 NOTE — GROUP NOTE
Group Therapy Note    Date: 2/6/2023    Group Start Time: 11:15 AM  Group End Time: 12:15 PM  Group Topic: Music Therapy    [facility]    Harrison Levin        Group Therapy Note    Music Therapy group consisted of deion analysis intervention and verbal processing of dreams as goals. Pts discussed different aspects of dreams (goals, fantasies, daydreams, etc.). Pts then engaged in art activity, expressing dreams from childhood, current goals, and expectations for future. Goals addressed include socialization, coping training, and goal-setting, quality of life, personal reflection. Attendees: 5       Notes:  Pt present and actively engaged during discussions and completion of expressive intervention. Met goal of group. No needs verbalized at conclusion. Status After Intervention:  Improved    Participation Level:  Active Listener and Interactive    Participation Quality: Appropriate      Speech:  normal      Thought Process/Content: Logical      Affective Functioning: Congruent      Mood: euthymic      Level of consciousness:  Alert and Attentive      Response to Learning: Capable of insight and Progressing to goal      Endings: None Reported    Modes of Intervention: Support, Socialization, Exploration, Activity, Media, and Reality-testing      Discipline Responsible: Psychoeducational Specialist      Signature:  Manuel Martinez MM, MT-BC

## 2023-02-08 ENCOUNTER — HOSPITAL ENCOUNTER (OUTPATIENT)
Dept: PSYCHIATRY | Age: 28
Setting detail: THERAPIES SERIES
Discharge: HOME | End: 2023-02-08
Payer: COMMERCIAL

## 2023-02-08 PROCEDURE — H2020 THER BEHAV SVC, PER DIEM: HCPCS

## 2023-02-08 NOTE — GROUP NOTE
Group Therapy Note    Date: 2/8/2023    Group Start Time:  8:30 AM  Group End Time:  9:45 AM  Group Topic: Psychotherapy    MELISSA Gomez        Group Therapy Note  Group members engaged in psychotherapy agenda group. Group members identified their individual agendas/goals and utilized the duration of group time to process, give, and receive feedback related to progress of goals. Focus was placed on remaining in the here and now in order to apply feedback learned in group to actual life circumstances and situations. Attendees: 4       Patient's Goal:  Patient reported she went out again and met 3 new friends. She stated she is laughing and enjoying herself for the first time in her life. Notes:  Patient shared that she came here and was unable to get out of the bed, totally shut down and had social anxiety. She shared that she is implementing the skills she is learning and it is surprising to her that she has been able to make a lot of progress in a short period of time. Patient shared that she is going to court today to support her mother against her child's father who she has not seen in 4 years. Group members supported her strength. Clinician asked to explore if she was prepared to deal with emotions that may be brought up that she has not thought about to be emotionally prepared with coping skills. Group members shared tips such as keeping her tongue on the roof of her mouth, and not responding to them at all if they try to bait her. Patient was able to accept the feedback and explore the possible outcomes. Status After Intervention:  Improved    Participation Level:  Active Listener and Interactive    Participation Quality: Appropriate, Attentive, Sharing, and Supportive      Speech:  normal      Thought Process/Content: Logical      Affective Functioning: Congruent      Mood: euthymic      Level of consciousness: Oriented x4      Response to Learning: Able to verbalize/acknowledge new learning      Endings: None Reported    Modes of Intervention: Support, Exploration, and Reality-testing      Discipline Responsible: /Counselor      Signature:  MELISSA Wild

## 2023-02-08 NOTE — GROUP NOTE
Group Therapy Note    Date: 2/8/2023    Group Start Time: 10:00 AM  Group End Time: 12:00 PM  Group Topic: Process Group - Outpatient    [facility]    [address] [facility]        Group Therapy Note    Attendees: 5    Facilitator led a group on perfectionism and the expectations patients set on themselves and others. Patients were presented with angelica and instructed to create two objects, one beautiful and one ugly. Patients were given an hour to create art whilst discussing what perfectionism meant to them and how the expectations they set impact their interactions with others and their self-view. After the group discussion and art time, patients shared their pieces and reflected on how they represent the expectations they set for themselves. Patients processed the difference in time and energy exerted on the different pieces and their overall feelings about how their art pieces turned out. Patients were then challenged to think about how this process reflected daily thoughts, feelings, and behaviors. Patients then discussed ways to move forward with setting realistic expectations and challenging perfectionist tendencies. Notes: Patient engaged fully in group discussion and created an beautiful and ugly art piece. Patient acknowledged that their ugly piece was more valued than the beautiful piece, and that although the beautiful piece was appreciated it wasn't perfect, despite taking more time and energy to create. Patient liked the art work to the expectations placed on them by others and by themselves, noting that being perfect is exhausting and unproductive in the long run. Patient related to others in that the ugly piece reflected their life stories and experiences, and that it was the imperfections that allowed for growth, strength, and individuality. Status After Intervention:  Improved    Participation Level:  Active Listener and Interactive    Participation Quality: Appropriate and Attentive      Speech:  normal      Thought Process/Content: Logical  Linear      Affective Functioning: Congruent      Mood: euthymic      Level of consciousness:  Alert, Oriented x4, and Attentive      Response to Learning: Able to verbalize current knowledge/experience, Able to verbalize/acknowledge new learning, Able to change behavior, and Progressing to goal      Endings: None Reported    Modes of Intervention: Support and Exploration      Discipline Responsible: /Counselor      Signature:  DAVID Chou

## 2023-02-10 ENCOUNTER — HOSPITAL ENCOUNTER (OUTPATIENT)
Dept: PSYCHIATRY | Age: 28
Setting detail: THERAPIES SERIES
Discharge: HOME | End: 2023-02-10
Payer: COMMERCIAL

## 2023-02-10 DIAGNOSIS — F33.1 MODERATE EPISODE OF RECURRENT MAJOR DEPRESSIVE DISORDER (HCC): Primary | ICD-10-CM

## 2023-02-10 PROCEDURE — H2020 THER BEHAV SVC, PER DIEM: HCPCS

## 2023-02-10 NOTE — GROUP NOTE
Group Therapy Note    Date: 2/10/2023    Group Start Time: 10:00 AM  Group End Time: 11:00 AM  Group Topic: Psychoeducation    [facility]    [address] [facility]        Group Therapy Note    Attendees: 6    Facilitator led a group discussion on the six categories of coping skills. Patients were provided with art materials and instructed to create 6 art vignettes depicting a desired coping skill, one vignette for each area of coping skills. Patients then discussed the pros and cons for using each coping skill and explored situations or environments where each skill may be best suited. Notes:  Patient completed art vignettes and was fully engaged in group discussion. Patient was able to identify ways that coping skills have been used successfully and acknowledged areas where coping skills could benefit from adjustment or improvement. Status After Intervention:  Improved    Participation Level:  Active Listener and Interactive    Participation Quality: Appropriate, Attentive, and Sharing      Speech:  normal      Thought Process/Content: Logical  Linear      Affective Functioning: Congruent      Mood: euthymic      Level of consciousness:  Alert, Oriented x4, and Attentive      Response to Learning: Able to verbalize current knowledge/experience, Able to verbalize/acknowledge new learning, Able to retain information, Capable of insight, Able to change behavior, and Progressing to goal      Endings: None Reported    Modes of Intervention: Education and Exploration      Discipline Responsible: /Counselor      Signature:  [address] [facility]

## 2023-02-10 NOTE — GROUP NOTE
Group Therapy Note    Date: 2/10/2023    Group Start Time: 11:15 AM  Group End Time: 12:15 PM  Group Topic: Music Therapy    [facility]    Harrison Robinsx        Group Therapy Note    Topic: Guided Imagery & Music    Subject: Intentional Silence    Facilitator led guided imagery and music session focused on peace, distress tolerance with environmental stimulation. Live music stimuli was provided to support intervention. Attendees: 5       Notes:  Successfully completed guided imagery intervention. No needs verbalized at conclusion of session. Status After Intervention:  Improved    Participation Level:  Active Listener    Participation Quality: Appropriate and Attentive      Speech:  normal      Thought Process/Content: Logical      Affective Functioning: Congruent      Mood: euthymic      Level of consciousness:  Alert and Oriented x4      Response to Learning: Progressing to goal      Endings: None Reported    Modes of Intervention: Support, Exploration, Activity, and Media      Discipline Responsible: Psychoeducational Specialist      Signature:  Carlo Tan MM, MAURICE

## 2023-02-10 NOTE — GROUP NOTE
Group Therapy Note    Date: 2/10/2023    Group Start Time:  8:30 AM  Group End Time:  9:50 AM  Group Topic: Psychotherapy    [facility]    MELISSA Mayen        Group Therapy Note  Group members engaged in psychotherapy agenda group. Group members identified their individual agendas/goals and utilized the duration of group time to process, give, and receive feedback related to progress of goals. Focus was placed on remaining in the here and now in order to apply feedback learned in group to actual life circumstances and situations. Attendees: 5       Patient's Goal:  Patient reported she is needing to find balance from her social activities. Notes:  Patient stated that she went from being completely emotionally shut down and isolating herself with social anxiety, to going out 4 times in the last week. She recognizes she needs to continue self reflecting to maintain her balance. Group members validated her. Patient responded to another member sharing how she has had years of therapy and all of her years of therapy, clicked at this treatment program. She shared how she is making improvements and finding her inner strength. She also shared that it takes a realistic expectation that there is not one thing that will make things better immediately. Status After Intervention:  Improved    Participation Level:  Active Listener and Interactive    Participation Quality: Appropriate, Attentive, Sharing, and Supportive      Speech:  normal      Thought Process/Content: Logical      Affective Functioning: Congruent      Mood: anxious and euthymic      Level of consciousness:  Oriented x4      Response to Learning: Able to verbalize/acknowledge new learning      Endings: None Reported    Modes of Intervention: Support and Clarifying      Discipline Responsible: /Counselor      Signature:  MELISSA Mayen

## 2023-02-15 ENCOUNTER — HOSPITAL ENCOUNTER (OUTPATIENT)
Dept: ULTRASOUND IMAGING | Age: 28
Discharge: HOME | End: 2023-02-15
Payer: COMMERCIAL

## 2023-02-15 ENCOUNTER — HOSPITAL ENCOUNTER (OUTPATIENT)
Dept: PSYCHIATRY | Age: 28
Setting detail: THERAPIES SERIES
Discharge: HOME | End: 2023-02-15
Payer: COMMERCIAL

## 2023-02-15 DIAGNOSIS — E04.1 THYROID NODULE: ICD-10-CM

## 2023-02-15 DIAGNOSIS — N63.23 MASS OF LOWER OUTER QUADRANT OF LEFT BREAST: ICD-10-CM

## 2023-02-15 PROCEDURE — 76536 US EXAM OF HEAD AND NECK: CPT

## 2023-02-15 PROCEDURE — 76642 ULTRASOUND BREAST LIMITED: CPT

## 2023-02-15 PROCEDURE — H2020 THER BEHAV SVC, PER DIEM: HCPCS
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
## 2023-02-15 NOTE — GROUP NOTE
Group Therapy Note    Date: 2/15/2023    Group Start Time: 10:00 AM  Group End Time: 11:00 AM  Group Topic: Psychoeducation    [facility]    Evangelina Pacheco        Group Therapy Note    Attendees: 7    Facilitator led a group discussion on Automatic Negative Thoughts (ANT). The patients explored the consequences of ruminating on ANT's and processed ways to challenge negative thoughts. Patients were presented with print outs of large ant figures and were given  instructions to write down an identified ANT. Patients were then challenged to reframe the negative thought into something more positive or helpful. Group concluded with members using provided fly-swatters to swat the ANT while speaking, with conviction, their positively reframed thought. Patient's Goal:  Patient's Goal:  Patient will be able to understand what an automatic negative thought and the significance behind challenging those thoughts. Patient will be able to identify an ANT and then be able to reframe the thought to something more positive or helpful. Notes:  Patient was engaged in activity and in group discussion. Patient was able to identify an ANT and successfully reframed the thought to something more positive or helpful. Status After Intervention:  Improved    Participation Level:  Active Listener and Interactive    Participation Quality: Appropriate, Attentive, Sharing, and Supportive      Speech:  normal      Thought Process/Content: Logical  Linear      Affective Functioning: Congruent      Mood: euthymic      Level of consciousness:  Alert, Oriented x4, and Attentive      Response to Learning: Able to verbalize current knowledge/experience, Able to verbalize/acknowledge new learning, Capable of insight, Able to change behavior, and Progressing to goal      Endings: None Reported    Modes of Intervention: Education, Exploration, and Activity      Discipline Responsible: /Counselor      Signature:  DAVID Motley

## 2023-02-17 ENCOUNTER — HOSPITAL ENCOUNTER (OUTPATIENT)
Dept: PSYCHIATRY | Age: 28
Setting detail: THERAPIES SERIES
Discharge: HOME | End: 2023-02-17
Payer: COMMERCIAL

## 2023-02-20 ENCOUNTER — HOSPITAL ENCOUNTER (OUTPATIENT)
Dept: PSYCHIATRY | Age: 28
Setting detail: THERAPIES SERIES
Discharge: HOME | End: 2023-02-20
Payer: COMMERCIAL

## 2023-02-20 DIAGNOSIS — F33.1 MODERATE EPISODE OF RECURRENT MAJOR DEPRESSIVE DISORDER (HCC): Primary | ICD-10-CM

## 2023-02-20 DIAGNOSIS — F43.10 PTSD (POST-TRAUMATIC STRESS DISORDER): Chronic | ICD-10-CM

## 2023-02-20 PROCEDURE — H2020 THER BEHAV SVC, PER DIEM: HCPCS

## 2023-02-20 NOTE — GROUP NOTE
Group Therapy Note    Date: 2/20/2023    Group Start Time: 10:00 AM  Group End Time: 11:00 AM  Group Topic: Psychoeducation    [facility]    MELISSA Bravo        Group Therapy Note  Clinician introduced the HARVEY skill of asking for help. Group members discussed the dialectic of needing help but feeling bad for asking for it. They discussed it is a lot like perfectionism. Patients learned the 6 areas of self care and took an assessment giving them insight to how they are doing in each of the 6 areas. Attendees: 6       Patient's Goal:      Notes:  Patient was fully engaged in the group discussion and activity. Patient was able to provide insight offering other members her perspective and support. Patient participated in the skills training and took the self assessment. Status After Intervention:  Improved    Participation Level:  Active Listener and Interactive    Participation Quality: Appropriate, Attentive, Sharing, and Supportive      Speech:  normal      Thought Process/Content: Logical      Affective Functioning: Congruent      Mood: euthymic      Level of consciousness:  Attentive      Response to Learning: Able to verbalize/acknowledge new learning      Endings: None Reported    Modes of Intervention: Education, Support, Exploration, and Activity      Discipline Responsible: /Counselor      Signature:  MELISSA Bravo

## 2023-02-20 NOTE — GROUP NOTE
Group Therapy Note    Date: 2/20/2023    Group Start Time:  8:30 AM  Group End Time:  9:45 AM  Group Topic: Psychotherapy    STEFFIZ GLORIA Puentes [place], ATR        Group Therapy Note    Attendees:     Group members engaged in psychotherapy agenda setting group. Group members identified their personal goal for group and engaged with peers in dialogue, active listening, and offering and accepting of feedback to increase overall interpersonal functioning and relationship building. Patient's Goal:  Learn how to handle situations where self protection is needed in new and better ways. Notes:  Fariba displayed positive ability to engage in group dialogue. She was able to share her own challenges with relationship; both accepting feedback from others and offering peers feedback in kind. Fariba reported program accountability to be helpful in making necessary life changes. She was able to express that she would like the group to continue to hold her accountable to her actions and decisions in much the same was as programming expectations do. She was able to relate to unhealthy relationship experiences of peers and seemed to gain insight when engaged in active listening of others situations. Status After Intervention:  Improved    Participation Level:  Active Listener and Interactive    Participation Quality: Appropriate, Attentive, Sharing, and Supportive      Speech:  normal      Thought Process/Content: Logical  Linear      Affective Functioning: Congruent      Mood: brightened      Level of consciousness:  Alert, Oriented x4, and Attentive      Response to Learning: Able to verbalize current knowledge/experience, Able to verbalize/acknowledge new learning, Able to retain information, Capable of insight, and Progressing to goal      Endings: None Reported    Modes of Intervention: Support and Exploration      Discipline Responsible: Psychoeducational Specialist      Signature:  Teri Galeano, MS, ATR-BC

## 2023-02-22 ENCOUNTER — HOSPITAL ENCOUNTER (OUTPATIENT)
Dept: PSYCHIATRY | Age: 28
Setting detail: THERAPIES SERIES
Discharge: HOME | End: 2023-02-22
Payer: COMMERCIAL

## 2023-02-22 VITALS
RESPIRATION RATE: 16 BRPM | DIASTOLIC BLOOD PRESSURE: 79 MMHG | SYSTOLIC BLOOD PRESSURE: 118 MMHG | TEMPERATURE: 97 F | HEART RATE: 95 BPM | OXYGEN SATURATION: 100 %

## 2023-02-22 DIAGNOSIS — F33.1 MODERATE EPISODE OF RECURRENT MAJOR DEPRESSIVE DISORDER (HCC): Primary | ICD-10-CM

## 2023-02-22 DIAGNOSIS — F43.10 PTSD (POST-TRAUMATIC STRESS DISORDER): Chronic | ICD-10-CM

## 2023-02-22 PROCEDURE — 99215 OFFICE O/P EST HI 40 MIN: CPT

## 2023-02-22 PROCEDURE — H2020 THER BEHAV SVC, PER DIEM: HCPCS

## 2023-02-22 NOTE — BH NOTE
Discharge Planning is Complete. Discharge Date: 2/22/2023  Discharge Diagnosis: Major Depressive Disorder, recurrent, moderate; PTSD    Your instructions: Your physician here was Jagruti TAVERAS. If you have any questions please do not hesitate to call [phone] with any questions you may have. Please note that we have a patient family advisory [place] that meets the second Wednesday of January and the second Wednesday of July AT 5pm in [place] at [facility]. Department leadership would love for you to attend to give feedback on what we are doing well and areas in which we can improve our patient care. Please come if you are able and feel free to reach out to [address] [place] [address] at [phone] with any questions. The crisis number for [facility] is [phone] (SAVE). This crisis line is available 24 hours a day, seven days a week. Please follow up with your PCP regarding any pending labs. You are being referred to [address] [facility]). Outpatient behavioral health assessments for adults are available on a walk-in basis at the following service locations. No appointment necessary during open access hours. Walk-in times: 8:30am- 12:00pm, Monday through Thursday (except holidays). [facility] Locations:  [place]: [address] [place] [address] - [phone] <- Tomorrow 2/23/2023 @ 9:00am <- Suggested walk in time  [place]: [address], [place] [facility] [address] - [phone]    Please bring a photo ID, insurance card, proof of address, current medicines, and copay. Sliding fee scale is available for [facility] residents who do not have insurance with proof of address. We also want to express the availability of Bridge Appointments here at [facility].   If you are connecting to community services and you find you have a long wait time until you can see your new psychiatrist, therapist, or primary care physician, know that we can provide health care services to you in the time between your care here and the beginning of your care with your new providers. As long as you have psychiatry and therapy appointments scheduled we can see you for medicine management. If you need a Bridge Appointment please call the office at [phone].     Fax to: Provider name and Number   [address] [facility] [phone]    Néstor TORRES, [place]

## 2023-02-22 NOTE — DISCHARGE SUMMARY
Discharge Summary     Admit Date: 2/22/2023   Discharge Date:    2/22/2023    Final Dx: <principal problem not specified>     At Discharge: 61-70 mild symptoms   All conditions and active problems were treated while patient was hospitalized. Condition on DC  Mood and affect are stable and pt is not suicidal     VITALS:  /79   Pulse 95   Temp 97 °F (36.1 °C) (Oral)   Resp 16   SpO2 100%     Brief Summary Present Illness   Today was last day of Pt's IOP program.  Pt left the facility after her first group and before I could speak with her. PE: (reviewed) and labs (see medical H&PE)  Labs:    No visits with results within 2 Day(s) from this visit.    Latest known visit with results is:   Admission on 01/21/2023, Discharged on 01/22/2023   Component Date Value Ref Range Status    WBC 01/21/2023 6.3  4.0 - 11.0 K/uL Final    RBC 01/21/2023 4.15  4.00 - 5.20 M/uL Final    Hemoglobin 01/21/2023 12.8  12.0 - 16.0 g/dL Final    Hematocrit 01/21/2023 37.6  36.0 - 48.0 % Final    MCV 01/21/2023 90.7  80.0 - 100.0 fL Final    MCH 01/21/2023 30.8  26.0 - 34.0 pg Final    MCHC 01/21/2023 34.0  31.0 - 36.0 g/dL Final    RDW 01/21/2023 14.6  12.4 - 15.4 % Final    Platelets 11/98/9921 234  135 - 450 K/uL Final    MPV 01/21/2023 8.5  5.0 - 10.5 fL Final    Neutrophils % 01/21/2023 56.8  % Final    Lymphocytes % 01/21/2023 35.8  % Final    Monocytes % 01/21/2023 6.3  % Final    Eosinophils % 01/21/2023 0.6  % Final    Basophils % 01/21/2023 0.5  % Final    Neutrophils Absolute 01/21/2023 3.6  1.7 - 7.7 K/uL Final    Lymphocytes Absolute 01/21/2023 2.3  1.0 - 5.1 K/uL Final    Monocytes Absolute 01/21/2023 0.4  0.0 - 1.3 K/uL Final    Eosinophils Absolute 01/21/2023 0.0  0.0 - 0.6 K/uL Final    Basophils Absolute 01/21/2023 0.0  0.0 - 0.2 K/uL Final    Sodium 01/21/2023 136  136 - 145 mmol/L Final    Potassium reflex Magnesium 01/21/2023 4.0  3.5 - 5.1 mmol/L Final    Chloride 01/21/2023 103  99 - 110 mmol/L Final CO2 01/21/2023 21  21 - 32 mmol/L Final    Anion Gap 01/21/2023 12  3 - 16 Final    Glucose 01/21/2023 102 (A)  70 - 99 mg/dL Final    BUN 01/21/2023 7  7 - 20 mg/dL Final    Creatinine 01/21/2023 0.7  0.6 - 1.1 mg/dL Final    Est, Glom Filt Rate 01/21/2023 >60  >60 Final    Comment: Pediatric calculator link  Alberto.at. org/professionals/kdoqi/gfr_calculatorped  Effective Oct 3, 2022  These results are not intended for use in patients  <25years of age. eGFR results are calculated without  a race factor using the 2021 CKD-EPI equation. Careful  clinical correlation is recommended, particularly when  comparing to results calculated using previous equations. The CKD-EPI equation is less accurate in patients with  extremes of muscle mass, extra-renal metabolism of  creatinine, excessive creatinine ingestion, or following  therapy that affects renal tubular secretion.       Calcium 01/21/2023 9.1  8.3 - 10.6 mg/dL Final    Total Protein 01/21/2023 7.7  6.4 - 8.2 g/dL Final    Albumin 01/21/2023 5.0  3.4 - 5.0 g/dL Final    Albumin/Globulin Ratio 01/21/2023 1.9  1.1 - 2.2 Final    Total Bilirubin 01/21/2023 0.7  0.0 - 1.0 mg/dL Final    Alkaline Phosphatase 01/21/2023 52  40 - 129 U/L Final    ALT 01/21/2023 37  10 - 40 U/L Final    AST 01/21/2023 49 (A)  15 - 37 U/L Final    Lactic Acid 01/21/2023 1.0  0.4 - 2.0 mmol/L Final    Ethanol Lvl 01/21/2023 None Detected  mg/dL Final    Comment:    None Detected  Conversion factor:  100 mg/dl = .100 g/dl  For Medical Purposes Only      hCG Qual 01/21/2023 Negative  Detects HCG level >10 MIU/mL Final    Acetaminophen Level 01/21/2023 <5 (A)  10 - 30 ug/mL Final    Comment: Therapeutic Range: 10.0-30.0 ug/mL  Toxic: >=078 ug/mL      Salicylate, Serum 36/59/6664 <0.3 (A)  15.0 - 30.0 mg/dL Final    Comment: Therapeutic Range: 15.0-30.0 mg/dL  Toxic: >30.0 mg/dL      SARS-CoV-2 RNA, RT PCR 01/21/2023 NOT DETECTED  NOT DETECTED Final    Comment: Not Detected results do not preclude SARS-CoV-2 infection and  should not be used as the sole basis for patient management  decisions.  Results must be combined with clinical observations,  patient history, and epidemiological information.  Testing was performed using ALESHA REBEKA SARS-CoV-2 and Influenza A/B  nucleic acid assay. This test is a multiplex Real-Time Reverse  Transcriptase Polymerase Chain Reaction (RT-PCR)-based in vitro  diagnostic test intended for the qualitative detection of nucleic  acids from SARS-CoV-2, influenza A, and influenza B in nasopharyngeal  and nasal swab specimens for use under the FDA’s Emergency Use  Authorization (EUA) only.    Patient Fact Sheet:  https://www.fda.gov/media/067014/download  Provider Fact Sheet: https://www.fda.gov/media/580690/download  EUA: https://www.fda.gov/media/291127/download  IFU: https://www.fda.gov/media/899685/download    Methodology:  RT-PCR      INFLUENZA A 01/21/2023 NOT DETECTED  NOT DETECTED Final    INFLUENZA B 01/21/2023 NOT DETECTED  NOT DETECTED Final    Ventricular Rate 01/21/2023 65  BPM Final    Atrial Rate 01/21/2023 65  BPM Final    P-R Interval 01/21/2023 156  ms Final    QRS Duration 01/21/2023 78  ms Final    Q-T Interval 01/21/2023 390  ms Final    QTc Calculation (Bazett) 01/21/2023 405  ms Final    R Axis 01/21/2023 74  degrees Final    T Axis 01/21/2023 52  degrees Final    Diagnosis 01/21/2023 Normal sinus rhythmNormal ECGWhen compared with ECG of 10-DEC-2012 15:08,Vent. rate has decreased BY  32 BPMT wave amplitude has increased in Lateral leadsConfirmed by DEBBY HINKLE MD ([id number]) on 1/22/2023 11:18:02 AM   Final    Lipase 01/21/2023 28.0  13.0 - 60.0 U/L Final          Mental Status Exam at Discharge:  Level of consciousness:  awake  Appearance:  well-appearing, in chair, good grooming and good hygiene well-developed, well-nourished  Behavior/Motor:  no abnormalities noted normal gait and station AIMS: 0  Attitude toward examiner:  cooperative, attentive  and good eye contact  Speech:  spontaneous, normal rate, normal volume and well articulated  Mood:  dysthymic  Affect:  mood congruent Anxiety: mild  Hallucinations: Absent  Thought processes:  coherent Attention span, Concentration & Attention:  attention span and concentration were age appropriate  Thought content:  No evidence of delusions OCD: none    Insight: normal insight and judgment Cognition:  oriented to person, place, and time  Fund of Knowledge: average  IQ:average Memory: intact  Suicide:  No specific plan to harm self  Sleep: sleeps through the night  Appetite: ok         Disposition - Residence Home or Self Care     Follow Up:  See Discharge Instructions       Mundo Vargas - PMHNP-BC

## 2023-02-22 NOTE — GROUP NOTE
Group Therapy Note    Date: 2/22/2023    Group Start Time:  8:45 AM  Group End Time:  9:45 AM  Group Topic: Psychotherapy    [facility] Puentes [place], ATR        Group Therapy Note    Attendees: 6    Group members engaged in an agenda psychotherapy group. They were able to identify individual goals of focus and engage interpersonally to receive and offer feedback, validation, and relate ability to make progress on stated goals. Patient's Goal:  Fariba reported she was \"proud of herself for completing the program\" as today was her final day and shared that she \"would like to continue to process what she did differently and how to maintain these changes\". Notes:  Génesis Salazar was in an upbeat mood and her affect was bright. She engaged verbally in group both offering and accepting feedback from group members. She was able to identify that perfectionism possibly plays a role in her struggle to \"think less and do more\" and that she is proud of the insight and awareness she has gained. She stated she is happy living by herself and is generally happier in the mornings when she is able to create her own stability and schedule, without concern for others. Status After Intervention:  Improved    Participation Level:  Active Listener and Interactive    Participation Quality: Appropriate, Attentive, Sharing, and Supportive      Speech:  normal      Thought Process/Content: Logical  Linear      Affective Functioning: bright      Mood: upbeat and engaging      Level of consciousness:  Alert, Oriented x4, and Attentive      Response to Learning: Able to verbalize current knowledge/experience, Able to verbalize/acknowledge new learning, Able to retain information, Capable of insight, Able to change behavior, and Progressing to goal      Endings: None Reported    Modes of Intervention: Support and Exploration      Discipline Responsible: Psychoeducational Specialist      Signature:  Geovany Mabry MS, ATR-BC

## 2023-02-22 NOTE — DISCHARGE INSTRUCTIONS
Discharge Planning is Complete. Discharge Date: 2/22/2023  Discharge Diagnosis: Major Depressive Disorder, recurrent, moderate; PTSD    Your instructions: Your physician here was David TAVERAS. If you have any questions please do not hesitate to call [phone] with any questions you may have. Please note that we have a patient family advisory [place] that meets the second Wednesday of January and the second Wednesday of July AT 5pm in [place] at [facility]. Department leadership would love for you to attend to give feedback on what we are doing well and areas in which we can improve our patient care. Please come if you are able and feel free to reach out to [address] [place] [address] at [phone] with any questions. The crisis number for [facility] is [phone] (SAVE). This crisis line is available 24 hours a day, seven days a week. Please follow up with your PCP regarding any pending labs. You are being referred to [address] [facility]). Outpatient behavioral health assessments for adults are available on a walk-in basis at the following service locations. No appointment necessary during open access hours. Walk-in times: 8:30am- 12:00pm, Monday through Thursday (except holidays). [facility] Locations:  [place]: [address] [place] [address] [facility] [address] - [phone] <- Tomorrow 2/23/2023 @ 9:00am <- Suggested walk in time  [place]: [address], [place] [address] - [phone]    Please bring a photo ID, insurance card, proof of address, current medicines, and copay. Sliding fee scale is available for [facility] residents who do not have insurance with proof of address. We also want to express the availability of Bridge Appointments here at [facility].   If you are connecting to community services and you find you have a long wait time until you can see your new psychiatrist, therapist, or primary care physician, know that we can provide health care services to you in the time between your care here and the beginning of your care with your new providers. As long as you have psychiatry and therapy appointments scheduled we can see you for medicine management. If you need a Bridge Appointment please call the office at [phone].     Fax to: Provider name and Number   [facility] Open Access [phone]

## 2023-02-22 NOTE — BH NOTE
Pt left IOP prior to start of second group. Writer not able to reach patient via phone. Pt's car is gone from parking lot. Writer spoke with pt's emergency contact/mother. She will reach out to pt and call back. Writer will mail pt's DC paperwork to address in chart. Pt did not see DARCY Toussaint for her DC visit. Pt's mother returned call stating she spoke with pt and pt is safe.

## 2023-02-24 ENCOUNTER — APPOINTMENT (OUTPATIENT)
Dept: PSYCHIATRY | Age: 28
End: 2023-02-24
Payer: COMMERCIAL

## 2023-03-05 ENCOUNTER — HOSPITAL ENCOUNTER (EMERGENCY)
Age: 28
Discharge: HOME | End: 2023-03-05
Payer: COMMERCIAL

## 2023-03-05 VITALS
SYSTOLIC BLOOD PRESSURE: 109 MMHG | RESPIRATION RATE: 14 BRPM | HEIGHT: 60 IN | TEMPERATURE: 98.9 F | WEIGHT: 110 LBS | OXYGEN SATURATION: 98 % | HEART RATE: 100 BPM | DIASTOLIC BLOOD PRESSURE: 73 MMHG | BODY MASS INDEX: 21.6 KG/M2

## 2023-03-05 DIAGNOSIS — R45.850 HOMICIDAL IDEATION: ICD-10-CM

## 2023-03-05 DIAGNOSIS — F12.90 MARIJUANA USE: ICD-10-CM

## 2023-03-05 DIAGNOSIS — R45.851 SUICIDAL IDEATION: Primary | ICD-10-CM

## 2023-03-05 LAB
A/G RATIO: 2.4 (ref 1.1–2.2)
ACETAMINOPHEN LEVEL: <5 UG/ML (ref 10–30)
ALBUMIN SERPL-MCNC: 5.3 G/DL (ref 3.4–5)
ALP BLD-CCNC: 50 U/L (ref 40–129)
ALT SERPL-CCNC: 11 U/L (ref 10–40)
AMPHETAMINE SCREEN, URINE: ABNORMAL
ANION GAP SERPL CALCULATED.3IONS-SCNC: 13 MMOL/L (ref 3–16)
AST SERPL-CCNC: 17 U/L (ref 15–37)
BACTERIA: ABNORMAL /HPF
BARBITURATE SCREEN URINE: ABNORMAL
BASOPHILS ABSOLUTE: 0 K/UL (ref 0–0.2)
BASOPHILS RELATIVE PERCENT: 0.3 %
BENZODIAZEPINE SCREEN, URINE: ABNORMAL
BILIRUB SERPL-MCNC: 0.8 MG/DL (ref 0–1)
BILIRUBIN URINE: NEGATIVE
BLOOD, URINE: NEGATIVE
BUN BLDV-MCNC: 12 MG/DL (ref 7–20)
CALCIUM SERPL-MCNC: 10.2 MG/DL (ref 8.3–10.6)
CANNABINOID SCREEN URINE: POSITIVE
CHLORIDE BLD-SCNC: 100 MMOL/L (ref 99–110)
CLARITY: CLEAR
CO2: 22 MMOL/L (ref 21–32)
COCAINE METABOLITE SCREEN URINE: ABNORMAL
COLOR: YELLOW
CREAT SERPL-MCNC: <0.5 MG/DL (ref 0.6–1.1)
EOSINOPHILS ABSOLUTE: 0 K/UL (ref 0–0.6)
EOSINOPHILS RELATIVE PERCENT: 0.1 %
EPITHELIAL CELLS, UA: ABNORMAL /HPF (ref 0–5)
ETHANOL: NORMAL MG/DL (ref 0–0.08)
FENTANYL SCREEN, URINE: ABNORMAL
GFR SERPL CREATININE-BSD FRML MDRD: >60 ML/MIN/{1.73_M2}
GLUCOSE BLD-MCNC: 84 MG/DL (ref 70–99)
GLUCOSE URINE: NEGATIVE MG/DL
HCG QUALITATIVE: NEGATIVE
HCT VFR BLD CALC: 40.1 % (ref 36–48)
HEMOGLOBIN: 13.6 G/DL (ref 12–16)
INFLUENZA A: NOT DETECTED
INFLUENZA B: NOT DETECTED
KETONES, URINE: NEGATIVE MG/DL
LEUKOCYTE ESTERASE, URINE: ABNORMAL
LYMPHOCYTES ABSOLUTE: 1.4 K/UL (ref 1–5.1)
LYMPHOCYTES RELATIVE PERCENT: 17.2 %
Lab: ABNORMAL
MCH RBC QN AUTO: 30.9 PG (ref 26–34)
MCHC RBC AUTO-ENTMCNC: 33.9 G/DL (ref 31–36)
MCV RBC AUTO: 91.2 FL (ref 80–100)
METHADONE SCREEN, URINE: ABNORMAL
MICROSCOPIC EXAMINATION: YES
MONOCYTES ABSOLUTE: 0.5 K/UL (ref 0–1.3)
MONOCYTES RELATIVE PERCENT: 5.9 %
MUCUS: ABNORMAL /LPF
NEUTROPHILS ABSOLUTE: 6.2 K/UL (ref 1.7–7.7)
NEUTROPHILS RELATIVE PERCENT: 76.5 %
NITRITE, URINE: NEGATIVE
OPIATE SCREEN URINE: ABNORMAL
OXYCODONE URINE: ABNORMAL
PDW BLD-RTO: 14 % (ref 12.4–15.4)
PH UA: 6
PH UA: 6 (ref 5–8)
PHENCYCLIDINE SCREEN URINE: ABNORMAL
PLATELET # BLD: 216 K/UL (ref 135–450)
PMV BLD AUTO: 8 FL (ref 5–10.5)
POTASSIUM REFLEX MAGNESIUM: 4.3 MMOL/L (ref 3.5–5.1)
PROTEIN UA: 30 MG/DL
RBC # BLD: 4.4 M/UL (ref 4–5.2)
RBC UA: ABNORMAL /HPF (ref 0–4)
SALICYLATE, SERUM: <0.3 MG/DL (ref 15–30)
SARS-COV-2 RNA, RT PCR: NOT DETECTED
SODIUM BLD-SCNC: 135 MMOL/L (ref 136–145)
SPECIFIC GRAVITY UA: >=1.03 (ref 1–1.03)
TOTAL PROTEIN: 7.5 G/DL (ref 6.4–8.2)
URINE REFLEX TO CULTURE: ABNORMAL
URINE TYPE: ABNORMAL
UROBILINOGEN, URINE: 0.2 E.U./DL
WBC # BLD: 8.1 K/UL (ref 4–11)
WBC UA: ABNORMAL /HPF (ref 0–5)

## 2023-03-05 PROCEDURE — 87636 SARSCOV2 & INF A&B AMP PRB: CPT

## 2023-03-05 PROCEDURE — 80307 DRUG TEST PRSMV CHEM ANLYZR: CPT

## 2023-03-05 PROCEDURE — 80143 DRUG ASSAY ACETAMINOPHEN: CPT

## 2023-03-05 PROCEDURE — 80179 DRUG ASSAY SALICYLATE: CPT

## 2023-03-05 PROCEDURE — 82077 ASSAY SPEC XCP UR&BREATH IA: CPT

## 2023-03-05 PROCEDURE — 84703 CHORIONIC GONADOTROPIN ASSAY: CPT

## 2023-03-05 PROCEDURE — 81001 URINALYSIS AUTO W/SCOPE: CPT

## 2023-03-05 PROCEDURE — 85025 COMPLETE CBC W/AUTO DIFF WBC: CPT

## 2023-03-05 PROCEDURE — 99285 EMERGENCY DEPT VISIT HI MDM: CPT

## 2023-03-05 PROCEDURE — 36415 COLL VENOUS BLD VENIPUNCTURE: CPT

## 2023-03-05 PROCEDURE — 80053 COMPREHEN METABOLIC PANEL: CPT

## 2023-03-05 ASSESSMENT — PAIN - FUNCTIONAL ASSESSMENT
PAIN_FUNCTIONAL_ASSESSMENT: NONE - DENIES PAIN
PAIN_FUNCTIONAL_ASSESSMENT: NONE - DENIES PAIN

## 2023-03-05 ASSESSMENT — ENCOUNTER SYMPTOMS
SORE THROAT: 0
RHINORRHEA: 0
COLOR CHANGE: 0
FACIAL SWELLING: 0
ABDOMINAL PAIN: 0
SHORTNESS OF BREATH: 0

## 2023-03-05 ASSESSMENT — LIFESTYLE VARIABLES
HOW MANY STANDARD DRINKS CONTAINING ALCOHOL DO YOU HAVE ON A TYPICAL DAY: PATIENT DOES NOT DRINK
HOW OFTEN DO YOU HAVE A DRINK CONTAINING ALCOHOL: NEVER
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
## 2023-03-05 NOTE — ED NOTES
Call placed again to pt's mother, Marley Sinha, for collateral at [phone]. No answer. LVM to return writer's call.      [address] [place] [address]  03/05/23 2372

## 2023-03-05 NOTE — DISCHARGE INSTR - COC
Continuity of Care Form    Patient Name: Jamison Villalba Da Carlson Frag[place]:  1995  MRN:  [id number]    Admit date:  3/5/2023  Discharge date:  ***    Code Status Order: Prior   Advance Directives:     Admitting Physician:  No admitting provider for patient encounter. PCP: KAITY Hurst - CNP    Discharging Nurse: [facility] Unit/Room#: B3/B3  Discharging Unit Phone Number: ***    Emergency Contact:   Extended Emergency Contact Information  Primary Emergency Contact:  Margret Sutton  Address: [facility] [address] Phone: [phone]  Mobile Phone: [phone]  Relation: Parent    Past Surgical History:  Past Surgical History:   Procedure Laterality Date    ESOPHAGUS SURGERY  2016    INDUCED   2014    WISDOM TOOTH EXTRACTION         Immunization History:   Immunization History   Administered Date(s) Administered    COVID-19, PFIZER PURPLE top, DILUTE for use, (age 15 y+), 30mcg/0.3mL 2021    Hepatitis A 2006, 2007    Hepatitis B 1997, 1997, 1997    Influenza Nasal 2009    Influenza Vaccine, unspecified formulation 2003, 2004, 2005, 2006, 10/29/2007, 10/28/2008, 2010, 2011    MMR 10/09/1997, 2001    Meningococcal ACWY Vaccine 2008    Polio IPV (IPOL) 1997, 1997, 1997, 1997, 2001    Tdap (Boostrix, Adacel) 1997, 1997, 1997, 1998, 2001, 2008, 2015, 2017    Varicella (Varivax) 2001, 2008       Active Problems:  Patient Active Problem List   Diagnosis Code    Other specified  screening(V28.89) Z36    Smoker F17.200    History of elective  Z98.890    Drug abuse, marijuana F12.10    Adjustment disorder with mixed anxiety and depressed mood F43.23    Multigravida Z64.1    Encounter for supervision of other normal pregnancy Z34.80    UTI in pregnancy O23.40    Moderate episode of recurrent major depressive disorder (HCC) F33.1    PTSD (post-traumatic stress disorder) F43.10    Less than 8 weeks gestation of pregnancy Z3A.01    Post partum depression F53.0       Isolation/Infection:   Isolation            No Isolation          Patient Infection Status       Infection Onset Added Last Indicated Last Indicated By Review Planned Expiration Resolved Resolved By    None active    Resolved    COVID-19 (Rule Out) 03/05/23 03/05/23 03/05/23 COVID-19 & Influenza Combo (Ordered)   03/05/23 Rule-Out Test Resulted    COVID-19 (Rule Out) 01/21/23 01/21/23 01/21/23 COVID-19 & Influenza Combo (Ordered)   01/21/23 Rule-Out Test Resulted    COVID-19 (Rule Out) 03/02/21 03/02/21 03/02/21 COVID-19 (Ordered)   03/03/21 Rule-Out Test Resulted            Nurse Assessment:  Last Vital Signs: /73   Pulse 100   Temp 98.9 °F (37.2 °C) (Temporal)   Resp 14   Ht 5' (1.524 m)   Wt 110 lb (49.9 kg)   SpO2 98%   BMI 21.48 kg/m²     Last documented pain score (0-10 scale):    Last Weight:   Wt Readings from Last 1 Encounters:   03/05/23 110 lb (49.9 kg)     Mental Status:  {IP PT MENTAL STATUS:77582}    IV Access:  { THAD IV ACCESS:443720020}    Nursing Mobility/ADLs:  Walking   {P DME WIHH:321139819}  Transfer  {P DME CFAC:254782461}  Bathing  {P DME NARD:871856201}  Dressing  {P DME YZHD:691289343}  Toileting  {P DME JFMF:011822879}  Feeding  {P DME NQFH:371711504}  Med Admin  {[facility] DME ZNAS:686988367}  Med Delivery   { THAD MED Delivery:993244534}    Wound Care Documentation and Therapy:        Elimination:  Continence: Bowel: {YES / DT:80494}  Bladder: {YES / FR:28369}  Urinary Catheter: {Urinary Catheter:136322346}   Colostomy/Ileostomy/Ileal Conduit: {YES / FB:23097}       Date of Last BM: ***  No intake or output data in the 24 hours ending 03/05/23 1610  No intake/output data recorded.     Safety Concerns:     Adele Ba [facility] Safety Concerns:827953019}    Impairments/Disabilities:      [address] Impairments/Disabilities:985033116}    Nutrition Therapy:  Current Nutrition Therapy:   [address] Diet List:426982270}    Routes of Feeding: {CHP DME Other Feedings:252043934}  Liquids: {Slp liquid thickness:88645}  Daily Fluid Restriction: {CHP DME Yes amt example:116429722}  Last Modified Barium Swallow with Video (Video Swallowing Test): {Done Not Done YQRT:149140852}    Treatments at the Time of Hospital Discharge:   Respiratory Treatments: ***  Oxygen Therapy:  {Therapy; copd oxygen:75203}  Ventilator:    { CC Vent LHZW:762288884}    Rehab Therapies: {THERAPEUTIC INTERVENTION:7676772300}  Weight Bearing Status/Restrictions: [address] Weight Bearin}  Other Medical Equipment (for information only, NOT a DME order):  {EQUIPMENT:768871411}  Other Treatments: ***    Patient's personal belongings (please select all that are sent with patient):  {CHP DME Belongings:190193170}    RN SIGNATURE:  {Esignature:611124049}    CASE MANAGEMENT/SOCIAL WORK SECTION    Inpatient Status Date: ***    Readmission Risk Assessment Score:  Readmission Risk              Risk of Unplanned Readmission:  0           Discharging to Facility/ Agency   Name:   Address:  Phone:  Fax:    Dialysis Facility (if applicable)   Name:  Address:  Dialysis Schedule:  Phone:  Fax:    / signature: {Esignature:688114598}    PHYSICIAN SECTION    Prognosis: {Prognosis:9369491218}    Condition at Discharge: [address] Patient Condition:273011973}    Rehab Potential (if transferring to Rehab): {Prognosis:7260118797}    Recommended Labs or Other Treatments After Discharge: ***    Physician Certification: I certify the above information and transfer of Shona Huerta  is necessary for the continuing treatment of the diagnosis listed and that she requires {Admit to Appropriate Level of Care:37656} for {GREATER/LESS:313213964} 30 days.      Update Admission H&P: {CHP DME Changes in [facility]:454340664}    PHYSICIAN SIGNATURE:  {Esignature:677265447}

## 2023-03-05 NOTE — ED PROVIDER NOTES
[address] ED  EMERGENCY DEPARTMENT ENCOUNTER      I am the Primary Clinician of Record    Note started: 1:10 PM EST 3/5/23    CARROL. I have evaluated this patient. My supervising physician was available for consultation. Pt Name: Georges Small  MRN: [id number]  [place] 1995  Dateof evaluation: 3/5/2023  Provider: KAITY Langston CNP  PCP: KAITY Hurst CNP  ED Attending: No att. providers found      [address] [place] [address]       Chief Complaint   Patient presents with    Psychiatric Evaluation     Pt was brought in by police on a Statement of Belief after reportedly making suicidal threats with a knife. HISTORY OF PRESENTILLNESS   (Location/Symptom, Timing/Onset, Context/Setting, Quality, Duration, Modifying Factors, Severity)  Note limiting factors. Georges Small is a 33 y.o. female for concern for suicidal and homicidal ideations. Onset was today. Context includes patient was brought in by the police on a psychiatric hold for threatening to kill herself, her mother, and her son. Per the  the hold states that she threatened to kill herself with a knife and also threatened to use a gun. Patient was found to have a large pink knife per the police. Patient reports that she went to her mother's house they got into an argument. Patient denies being suicidal or homicidal. Alleviating factors include nothing. Aggravating factors include nothing. Pain is 0/10. Nothing has been used for pain today. Nursing Notes were all reviewed and agreed with or any disagreements were addressed  in the HPI. REVIEW OF SYSTEMS       Review of Systems   Constitutional:  Negative for activity change, appetite change and fever. HENT:  Negative for congestion, facial swelling, rhinorrhea and sore throat. Eyes:  Negative for visual disturbance. Respiratory:  Negative for shortness of breath. Cardiovascular:  Negative for chest pain. Gastrointestinal:  Negative for abdominal pain. Genitourinary:  Negative for difficulty urinating. Musculoskeletal:  Negative for arthralgias and myalgias. Skin:  Negative for color change and rash. Neurological:  Negative for dizziness and light-headedness. Psychiatric/Behavioral:  Positive for self-injury and suicidal ideas. Negative for agitation. All other systems reviewed and are negative. Positives and Pertinent negatives as per HPI. Except as noted above in the ROS, all other systems were reviewed and negative.        PAST MEDICAL HISTORY     Past Medical History:   Diagnosis Date    ADD (attention deficit disorder)     Anxiety     Bipolar affective ([facility]     Depression     Drug abuse, marijuana 2015    Hormone disorder     PTSD (post-traumatic stress disorder)     Urinary tract infection     Yeast infection          SURGICAL HISTORY       Past Surgical History:   Procedure Laterality Date    ESOPHAGUS SURGERY  2016    INDUCED   2014    WISDOM TOOTH EXTRACTION  2018         CURRENT MEDICATIONS       Previous Medications    SERTRALINE (ZOLOFT) 100 MG TABLET    Take 0.5 tablets by mouth daily         ALLERGIES     Latex and Monistat [miconazole]      FAMILY HISTORY       Family History   Problem Relation Age of Onset    High Blood Pressure Mother     High Cholesterol Mother     Mental Illness Mother     High Blood Pressure Father     High Cholesterol Father     Mental Illness Father     OCD Sister     High Blood Pressure Maternal Grandmother     Scoliosis Maternal Grandmother     Heart Disease Maternal Grandfather     Clotting Disorder Maternal Grandfather     Mental Illness Maternal Grandfather     Cancer Paternal Grandfather           SOCIAL HISTORY       Social History     Socioeconomic History    Marital status: Single   Tobacco Use    Smoking status: Former     Years: 10.00     Types: Cigarettes     Quit date: 2018     Years since quittin.6    Smokeless tobacco: Never   Vaping Use    Vaping Use: Never used   Substance and Sexual Activity    Alcohol use: Not Currently     Comment: holidays    Drug use: Yes     Types: Marijuana [place] [facility])     Comment: \"I smoke alot\"    Sexual activity: Yes     Partners: Male         SCREENINGS    Roxton Coma Scale  Eye Opening: Spontaneous  Best Verbal Response: Oriented  Best Motor Response: Obeys commands  Roxton Coma Scale Score: 15      CIWA Assessment  BP: 109/73  Heart Rate: 100          PHYSICAL EXAM     ED Triage Vitals [03/05/23 1307]   BP Temp Temp Source Heart Rate Resp SpO2 Height Weight   121/77 98.2 °F (36.8 °C) Temporal (!) 104 16 99 % -- --       Physical Exam  Constitutional:       Appearance: Normal appearance. She is well-developed. HENT:      Head: Normocephalic and atraumatic. Cardiovascular:      Rate and Rhythm: Tachycardia present. Pulmonary:      Effort: Pulmonary effort is normal. No respiratory distress. Abdominal:      General: There is no distension. Palpations: Abdomen is soft. Musculoskeletal:         General: Normal range of motion. Cervical back: Normal range of motion. Skin:     General: Skin is warm and dry. Neurological:      Mental Status: She is alert and oriented to person, place, and time. Psychiatric:         Thought Content: Thought content includes suicidal ideation. Thought content does not include homicidal ideation. Thought content includes suicidal plan. Thought content does not include homicidal plan.       Comments: Patient denies any suicidal or homicidal ideations         DIAGNOSTIC RESULTS   LABS:    Labs Reviewed   COMPREHENSIVE METABOLIC PANEL W/ REFLEX TO MG FOR LOW K - Abnormal; Notable for the following components:       Result Value    Sodium 135 (*)     Creatinine <0.5 (*)     Albumin 5.3 (*)     Albumin/Globulin Ratio 2.4 (*)     All other components within normal limits   URINALYSIS WITH REFLEX TO CULTURE - Abnormal; Notable for the following components: Protein, UA 30 (*)     Leukocyte Esterase, Urine TRACE (*)     All other components within normal limits   URINE DRUG SCREEN - Abnormal; Notable for the following components:    Cannabinoid Scrn, Ur POSITIVE (*)     All other components within normal limits   ACETAMINOPHEN LEVEL - Abnormal; Notable for the following components:    Acetaminophen Level <5 (*)     All other components within normal limits   SALICYLATE LEVEL - Abnormal; Notable for the following components:    Salicylate, Serum <7.0 (*)     All other components within normal limits   MICROSCOPIC URINALYSIS - Abnormal; Notable for the following components:    Mucus, UA 3+ (*)     Epithelial Cells, UA 6-10 (*)     Bacteria, UA 1+ (*)     All other components within normal limits   COVID-19 & INFLUENZA COMBO   CBC WITH AUTO DIFFERENTIAL   HCG, SERUM, QUALITATIVE   ETHANOL         All other labs were within normal range or not returned as of this dictation. EKG: All EKG's are interpreted by the Emergency Department Physician who either signs or Co-signs this chart in the absence of a cardiologist.  Please see their note for interpretation of EKG. RADIOLOGY:   Non plain film images ans such as CT, ultrasound, and MRI are read by the radiologist. Plain radiographic images are visualized and preliminarily interpreted by the ED Provider with the below findings:            Interpretation per the Radiologist below, if available at the time of this note:    No orders to display     No results found. No results found. No results found.       PROCEDURES   Unless otherwise noted below, none     Procedures     CRITICAL CARE TIME   Unless otherwise noted below, none          EMERGENCYDEPARTMENT COURSE/MDM:     Vitals:    Vitals:    03/05/23 1307 03/05/23 1401 03/05/23 1438   BP: 121/77  109/73   Pulse: (!) 104  100   Resp: 16  14   Temp: 98.2 °F (36.8 °C)  98.9 °F (37.2 °C)   TempSrc: Temporal  Temporal   SpO2: 99%  98%   Weight:  110 lb (49.9 kg)    Height: 5' (1.524 m)          Patient was seen and evaluated by myself. Patient was seen and evaluated by myself for psychiatric evaluation. Patient reports that she went to her mother's house and got into an argument and left. Patient reports the police pulled her over and brought her in to be evaluated because her mother told the police that she threatened to kill herself or her child and her mother. Patient was brought in by the police today on a hold stating that she threatened to kill herself her mother and her child with a gun and also with a pink knife. Police report that she had a large pink knife on her. On exam she is awake and alert she was found to be slightly tachycardic with a heart rate of 104 however she is upset stating that this was not true. Patient denies any suicidal or homicidal ideations while in the ED. On reevaluation the patient's heart rate has improved. Lab values have been reviewed and interpreted. At this time the patient is considered medically cleared and has been consulted with behavioral health for an evaluation and assistance in final disposition. Patient was given the following medications:  Medications - No data to display         CONSULTS:  None      Discussion with other professionals - none    Social determinants - none    Records Reviewed - none    History from - patient    Limitations to history- none    Chronic conditions: has a past medical history of ADD (attention deficit disorder), Anxiety, Bipolar affective ([facility] [address] Depression, Drug abuse, marijuana (2/12/2015), Hormone disorder, PTSD (post-traumatic stress disorder), Urinary tract infection, and Yeast infection. Is this patient to be included in the SEP-1 Core Measure due to severe sepsis or septic shock? No   Exclusion criteria - the patient is NOT to be included for SEP-1 Core Measure due to: Infection is not suspected         The patient tolerated their visit well. I have evaluated this patient. My supervising physician was available for consultation. The patient and / or the family were informed of the results of any tests, a time was given to answer questions, a plan was proposed and they agreed with plan. FINAL IMPRESSION      1. Suicidal ideation    2. Homicidal ideation    3. Marijuana use          DISPOSITION/PLAN   DISPOSITION Ed Observation 03/05/2023 02:41:48 PM      PATIENT REFERRED TO:  KAITY Hurst CNP  [place] [address]  [phone]    Schedule an appointment as soon as possible for a visit in 2 days  for re-evaluation    [facility] ED  [address] [facility]  [phone]    for re-evaluation    [address] [facility]  call [phone] to schedule an appointment    for re-evaluation    DISCHARGE MEDICATIONS:  New Prescriptions    No medications on file       DISCONTINUED MEDICATIONS:  Discontinued Medications    No medications on file              (Please note that portions of this note were completed with a voice recognition program.  Efforts were made to edit the dictations but occasionally words are mis-transcribed.)    Patient was seen during the time of the [place] pandemic. N95 and appropriate PPE was worn during the visit.       KAITY Langston CNP (electronically signed)        KAITY Langston CNP  03/05/23 [facility] C[facility]  03/05/23 0631

## 2023-03-05 NOTE — ED NOTES
Pt gave verbal permission to call her mother, Tim Kelley ([phone]), for collateral. Message left on  requesting a return call.       Cal Bruno RN  03/05/23 0044

## 2023-03-05 NOTE — ED NOTES
Pt came to nurse's station and requested an ETA with the dispo. Staff explained that there were attempts to speak with pt's mother to obtain collateral and that attempts have not been successful. Pt stated, \"You're not going to get a hold of her. She is never going to get back with you. She's purposefully not answering so that she doesn't have to deal with me and so I won't be let out of here. This is what happened the last time I was here too. \" Staff explained that any decision would be up to the on call psychiatric provider.       [address] [place] [address]  03/05/23 8687

## 2023-03-05 NOTE — ED NOTES
Level of Care Disposition: Discharge     Patient was seen by ED provider and [facility] AFFILIATE OF [facility] staff. The case was presented to psychiatric provider on-call Dr. Sawyer Maradiaga. Based on the ED evaluation and information presented to the provider by this writer, it is the recommendation of the on call psychiatric provider that the patient be discharged from a psychiatric standpoint with outpt counseling and psychiatry referrals.                [address] [place] [address]  03/05/23 9400

## 2023-03-05 NOTE — ED NOTES
Pt was provided a phone and made several more attempts to contact her mother without success.       [address], [place]  03/05/23 9549

## 2023-03-05 NOTE — ED NOTES
Pt changed into gown and belongings secured in locker. Urine, blood and COVID specimens collected and sent to lab.       Ta Gilbert RN  03/05/23 8167

## 2023-03-05 NOTE — ED NOTES
Received a return call from pt's mother, Billie James. She stated pt was upset because a job she applied for has not called her back and she would not give her money. \" She came over to my house, stormed into the front door, stomping her feet, being verbally abusive and stating that no one ever helps her and that if I didn't help her she would put a bullet in my head, her son's head, her father's head, and other family members\". Billie James  then said, \" It's always the same cycle. I've been doing this for 14 years\". She kept referring to pt's behavior as being in a \"manic\" episode. Writer asked how often she speaks to her daughter and she stated she speaks to her everyday. Writer asked how she was yesterday and she said, \"oh, she was fine yesterday. She's been great\". Writer explained that her description of her behavior sounded more like she was upset rather than a manic episode. \" I want your full name! This is the problem with mental health. She gets released and then she refuses to take medication or go to therapy. You guys need to get her the help she needs\". Writer explained that there is help available but depending on the circumstances, cannot be forced into treatment. Billie James stated that pt is not capable of making adequate decisions because of her mental illness and even though she is well spoken she is \"like a child\". Writer then asked if she has considered obtaining guardianship if she feels she is too incapacitated to make decisions. \" I have tried but she passes every psychological evaluation with flying colors. She is very smart and depending on which personality comes out she can talk her way out of anything\". Writer asked in more detail about patient making threatening statements. \" I never said she threatened me with a gun. She doesn't own a gun\".  As the conversation progressed and this writer attempted to clarify inconsistencies in her statements she circled back around to how there is a flaw in the mental health system and \"we\" are not doing our jobs. Writer asked if she felt threatened by pt and felt pt could harm her she said, \"yes, I mean how would I know if she can get a gun\"? Writer asked if she planned on filing a restraining order or pressing charges. \" No, why would I do that? This is a mental illness issue, not a police issue. I'm the one who has begged a  not to sentence her to [facility] because she can't help her actions because of her Borderline Personality Disorder\". Jyoti Hernandez stated we wants patient to be \"held\" for 72 hours.  Writer explained that the final disposition is determined by the on call psychiatrist.          Silva Goltz, RN  03/05/23 2398

## 2023-03-05 NOTE — ED NOTES
Writer spoke with pt's father, Iglesia, at [phone] for collateral. Father lives in [place] and was not present during argument between pt and her mother. Father was unable to state if he felt pt would be safe to be released, however, did state, \"If you're asking if I think my daughter would go and kill someone or herself the answer is no, she's not going to do that\". Father would like pt to have additional resources for continued counseling and mental health tx.      Sydnee Cifuentes, [facility]  03/05/23 1367

## 2023-03-06 NOTE — ED NOTES
CPS report made to Petra in [facility] in regards to concern for child's safety.     Sydnee Cifuentes, [facility]  03/05/23 1919

## 2023-10-18 ENCOUNTER — TRANSCRIBE ORDERS (OUTPATIENT)
Dept: ADMINISTRATIVE | Age: 28
End: 2023-10-18

## 2023-10-18 DIAGNOSIS — R10.13 ABDOMINAL PAIN, EPIGASTRIC: Primary | ICD-10-CM

## 2023-10-26 ENCOUNTER — TELEPHONE (OUTPATIENT)
Dept: FAMILY MEDICINE CLINIC | Age: 28
End: 2023-10-26

## 2023-10-26 NOTE — TELEPHONE ENCOUNTER
Called patient to reschedule appointment for 11/06/23 due to provider will be out of office. Patient stated she will call back to reschedule.

## 2023-11-28 ENCOUNTER — APPOINTMENT (OUTPATIENT)
Dept: GENERAL RADIOLOGY | Age: 28
End: 2023-11-28
Payer: COMMERCIAL

## 2023-11-28 ENCOUNTER — HOSPITAL ENCOUNTER (EMERGENCY)
Age: 28
Discharge: HOME | End: 2023-11-28
Payer: COMMERCIAL

## 2023-11-28 VITALS
DIASTOLIC BLOOD PRESSURE: 70 MMHG | TEMPERATURE: 97.2 F | SYSTOLIC BLOOD PRESSURE: 124 MMHG | OXYGEN SATURATION: 99 % | RESPIRATION RATE: 18 BRPM | HEART RATE: 78 BPM

## 2023-11-28 DIAGNOSIS — J45.909 ACUTE ASTHMATIC BRONCHITIS: Primary | ICD-10-CM

## 2023-11-28 DIAGNOSIS — Z87.09 HISTORY OF ASTHMA: ICD-10-CM

## 2023-11-28 DIAGNOSIS — F12.90 MARIJUANA USE: ICD-10-CM

## 2023-11-28 LAB
FLUAV RNA RESP QL NAA+PROBE: NOT DETECTED
FLUBV RNA RESP QL NAA+PROBE: NOT DETECTED
SARS-COV-2 RNA RESP QL NAA+PROBE: NOT DETECTED

## 2023-11-28 PROCEDURE — 71046 X-RAY EXAM CHEST 2 VIEWS: CPT

## 2023-11-28 PROCEDURE — 6360000002 HC RX W HCPCS: Performed by: PHYSICIAN ASSISTANT

## 2023-11-28 PROCEDURE — 87636 SARSCOV2 & INF A&B AMP PRB: CPT

## 2023-11-28 PROCEDURE — 99284 EMERGENCY DEPT VISIT MOD MDM: CPT

## 2023-11-28 RX ORDER — ALBUTEROL SULFATE 90 UG/1
1-2 AEROSOL, METERED RESPIRATORY (INHALATION) EVERY 6 HOURS PRN
Qty: 18 G | Refills: 0 | Status: SHIPPED | OUTPATIENT
Start: 2023-11-28

## 2023-11-28 RX ORDER — PREDNISONE 10 MG/1
20 TABLET ORAL DAILY
Qty: 10 TABLET | Refills: 0 | Status: SHIPPED | OUTPATIENT
Start: 2023-11-28 | End: 2023-12-03

## 2023-11-28 RX ORDER — ALBUTEROL SULFATE 2.5 MG/3ML
5 SOLUTION RESPIRATORY (INHALATION) ONCE
Status: COMPLETED | OUTPATIENT
Start: 2023-11-28 | End: 2023-11-28

## 2023-11-28 RX ADMIN — ALBUTEROL SULFATE 5 MG: 2.5 SOLUTION RESPIRATORY (INHALATION) at 10:06

## 2023-11-28 NOTE — DISCHARGE INSTRUCTIONS
I do recommend discontinuation of inhaled products such as marijuana. I do recommend the use and I did prescribe Ventolin inhaler with spacer as well as prednisone. We also discussed menthol cough drops. We discussed the use of Mucinex DM twice a day. X-ray was negative. Flu and COVID studies are negative.

## 2023-11-28 NOTE — ED PROVIDER NOTES
[address] [address] ED  EMERGENCY DEPARTMENT ENCOUNTER        Pt Name: Nancy Arriaza  MRN: [id number]  [address] [facility] 1995  Date of evaluation: 2023  Provider: Maryjo Ashley PA-C  PCP: KAITY Murphy - CNP  Note Started: 9:49 AM EST 23      CARROL. I have evaluated this patient. CHIEF COMPLAINT       Chief Complaint   Patient presents with    URI     Pt presents with a cough and congestion. Pt states she has had it for approx one week. Pt states cough has become productive and is clear in color. HISTORY OF PRESENT ILLNESS: 1 or more Elements     History From: Patient    Nancy Arriaza is a 33 y.o. female who presents to the emergency department with concern cough, nasal discharge, ears clogged occasional headache. Worried that she might have or developing pneumonia as her mother ill for about a week and 48 hours ago noted to have pneumonia. Patient reports no sputum production. She does state increased cough tickly at night and lying supine. She does smoke medical marijuana. She indicates loose stool this morning. Otherwise no nausea, vomiting, recurrent diarrhea or urinary symptoms. Denies any fevers or chills. Had night sweats x 1. No history of asthma. Nursing Notes were all reviewed and agreed with or any disagreements were addressed in the HPI. REVIEW OF SYSTEMS :      Review of Systems    Positives and Pertinent negatives as per HPI.      SURGICAL HISTORY     Past Surgical History:   Procedure Laterality Date    ESOPHAGUS SURGERY  2016    INDUCED   2014    WISDOM TOOTH EXTRACTION  2018       CURRENTMEDICATIONS       Previous Medications    SERTRALINE (ZOLOFT) 100 MG TABLET    Take 0.5 tablets by mouth daily       ALLERGIES     Latex and Monistat [miconazole]    FAMILYHISTORY       Family History   Problem Relation Age of Onset    High Blood Pressure Mother     High Cholesterol Mother     Mental Illness Mother     High Blood

## 2024-09-30 ENCOUNTER — FOLLOWUP TELEPHONE ENCOUNTER (OUTPATIENT)
Dept: PSYCHIATRY | Age: 29
End: 2024-09-30